# Patient Record
Sex: MALE | Race: WHITE | NOT HISPANIC OR LATINO | Employment: UNEMPLOYED | URBAN - METROPOLITAN AREA
[De-identification: names, ages, dates, MRNs, and addresses within clinical notes are randomized per-mention and may not be internally consistent; named-entity substitution may affect disease eponyms.]

---

## 2020-03-27 ENCOUNTER — TRANSFERRED RECORDS (OUTPATIENT)
Dept: HEALTH INFORMATION MANAGEMENT | Facility: CLINIC | Age: 45
End: 2020-03-27

## 2020-09-28 ENCOUNTER — TRANSFERRED RECORDS (OUTPATIENT)
Dept: HEALTH INFORMATION MANAGEMENT | Facility: CLINIC | Age: 45
End: 2020-09-28

## 2020-11-05 ENCOUNTER — TRANSFERRED RECORDS (OUTPATIENT)
Dept: HEALTH INFORMATION MANAGEMENT | Facility: CLINIC | Age: 45
End: 2020-11-05

## 2021-02-03 ENCOUNTER — TRANSFERRED RECORDS (OUTPATIENT)
Dept: HEALTH INFORMATION MANAGEMENT | Facility: CLINIC | Age: 46
End: 2021-02-03

## 2021-05-24 ENCOUNTER — TRANSFERRED RECORDS (OUTPATIENT)
Dept: HEALTH INFORMATION MANAGEMENT | Facility: CLINIC | Age: 46
End: 2021-05-24

## 2021-10-24 ENCOUNTER — TRANSFERRED RECORDS (OUTPATIENT)
Dept: HEALTH INFORMATION MANAGEMENT | Facility: CLINIC | Age: 46
End: 2021-10-24

## 2021-11-15 ENCOUNTER — TRANSFERRED RECORDS (OUTPATIENT)
Dept: HEALTH INFORMATION MANAGEMENT | Facility: CLINIC | Age: 46
End: 2021-11-15
Payer: MEDICAID

## 2021-11-16 ENCOUNTER — TRANSCRIBE ORDERS (OUTPATIENT)
Dept: OTHER | Age: 46
End: 2021-11-16
Payer: MEDICAID

## 2021-11-16 DIAGNOSIS — I47.19 ATRIAL TACHYCARDIA (H): ICD-10-CM

## 2021-11-16 DIAGNOSIS — I42.0 DILATED CARDIOMYOPATHY (H): ICD-10-CM

## 2021-11-16 DIAGNOSIS — I50.22 CHRONIC SYSTOLIC CONGESTIVE HEART FAILURE (H): Primary | ICD-10-CM

## 2021-11-17 NOTE — TELEPHONE ENCOUNTER
RECORDS RECEIVED FROM:    DATE RECEIVED:    NOTES STATUS DETAILS   OFFICE NOTE from referring provider    Care Everywhere Dr. Luis Eugene 11-15-21   OFFICE NOTE from other cardiologists   and neurologists Care Everywhere Dr. Luis Eugene 11-15-21   DISCHARGE SUMMARY from hospital    N/A    DISCHARGE REPORT from the ER   Care Everywhere 10-24-21   OPERATIVE REPORT    N/A    MEDICATION LIST   N/A    LABS     BMP   Internal 4-16-21   CBC   N/A    CMP   Internal 10-24-21   Lipids   Internal 1-8-21   TSH   Internal 4-16-21   DIAGNOSTIC PROCEDURES     EKG  Strips *important In process 10-24-21 [facility]   Monitor Reports  Strips *important N/A    Cardioversions   N/A    ICD/pacemaker implant   Yes    Tilt table studies   Care Everywhere 5-24-21   IMAGING (DISC & REPORT)      ECHO's   In process 11-15-21 [facility]   Stress Tests   In process 2-3-21 [facility]   Cath   N/A    CT/CTA   N/A    MRI/MRA   N/A      Action 11/17/21 [facility]   Action Taken Requested from [facility]:    Echo    Pacemaker eval    EKG Strip    Stress EKG   11-15-21, 3-28-20    10-26-21    10-24-21, 11-5-20, 9-28-20, 3-27-20    2-3-21     Sent pacemaker eval, stress EKG, and EKG strips to scanning    --11/19/21 MV 12.44pm--  Imaging disk received from [facility] and brought to [facility] for processing.    Images on disk:  Echo Complete 11/15/21  Echo Complete 3/28/20

## 2021-11-21 NOTE — PROGRESS NOTES
"    ELECTROPHYSIOLOGY VIRTUAL CLINIC VISIT  Mr. Vinay Patrick is a 46 year old male who is being evaluated via a billable video visit.      The patient has been notified of following:     \"This video visit will be conducted via a call between you and your physician/provider. We have found that certain health care needs can be provided without the need for an in-person physical exam.  This service lets us provide the care you need with a video conversation.  If a prescription is necessary we can send it directly to your pharmacy.  If lab work is needed we can place an order for that and you can then stop by our lab to have the test done at a later time.  1  If during the course of the call the physician/provider feels a video visit is not appropriate, you will not be charged for this service.\"     Physician/provider has received verbal consent for a Video Visit from the patient? Yes    Assessment/Recommendations   Assessment/Plan:    Mr. 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  He is referred for management of recent VT event with an ICD shock.    The patient has inappropriate sinus tachycardia and is on ivabradine.  He does have also autonomic dysfunction.  Some of the episodes recorded on the device will one-to-one SVT or sinus tachycardia.  Given his history they are very likely to be sinus tachycardia although the tracings are not very clear on the faxed documents.  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  This is his first episode since 3 years ago in 2018 when he had his last ICD shock.  Of note, the patient had stopped his medications around August 2021 and had just started his medications a few weeks before this event.  He has not had any recurrences since this event.      We " discussed with the patient the possibility of VT ablation.  We discussed the procedure in detail with the patient including the risks and success rate.  The risks include vascular bleeding in the groin or around the heart, risk of stroke, and increased risk of bleeding around the heart if we need to do epicardial access for epicardial VT ablation, AV block, heart failure decompensation.  We currently do not advise to pursue a VT ablation at this time given that the recurrence happened very close to him restarting his heart failure medications that were stopped in August 2021.  Furthermore, given the the previous VT was in 2018, it makes it less likely to induce his clinical VT during the procedure.  There is a good chance that he will do well for a longer period of time if he is compliant with his medications.  The other option to consider is antiarrhythmic medications.  However, given his liver cirrhosis and structural heart disease, several of the usually considered antiarrhythmic medications are either absolutely or partially contraindicated.  We could try mexiletine again, with the other options being sotalol or dofetilide.  Mexiletine and dofetilide have limited efficacy for ventricular arrhythmias and sotalol might exacerbate his dizziness or syncope.  Amiodarone would not be the first choice given his age and his liver cirrhosis.  We also considered increasing his carvedilol however he expressed to us that his dizziness got worse with higher doses given his history of autonomic dysfunction.  At this stage, we do not see a very good reason to escalate medical therapy or add antiarrhythmic medications given the infrequency of the events.  However, if the episodes increase in frequency and the VT is monomorphic, we will consider ablation.    I convened with Dr. Christianson who saw him today from heart failure standpoint.  He agrees with the plan.  He is evaluating his heart failure and moderate to severe mitral valve  regurgitation.  He would be aiming to see him in about a month or so.  I want him to see the patient in 3 months virtually or in person depending on the outcome of his discussion with heart failure team.    Maxwell Goldberg MD [facility]  Cardiology - Electrophysiology         History of Present Illness/Subjective    Mr. Vinay Patrick is a 46 year old male who comes in today for EP consultation of VT with ICD therapies.    Mr. 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     He was first diagnosed with NICM in 2016 with echo at that time showing LVEF 20%, severe MR, and grade II diastolic dysfunction. Angiogram at that time showed no obstructive CAD. He was placed on GDMT and LVEF remained reduced. He had ICD implanted in 12/2017. On 11/8/2018 he had VF arrest with ICD shock -200 ms. Mexiletine was started at that time and Coreg was increased. At some point his Mexiletine was stopped. He had further ICD shock on 10/24/21. Device reportedly showed a 1:1 tachycardia at 460 ms prior to going into VT at 240 ms for 18 seconds and device delivered a shock. His device was reprogrammed. His device has also recorded a number of tachycardia episodes that are reported to be 1:1 ST vs SVT -380 ms. He has had a number of syncopal episodes not related to arrhythmias. He had a tilt table test in 5/2021 that showed delayed OH. His bumex was decreased and he was recommended to wear compression stockings. He has had difficulties tolerating neurohormonal agents due to hypotension and syncope related to this. Because of this he stopped taking his medications 8/2021 and was off of them for about 2 months. He was recently resumed on his medications.     He was referred today for VT management. He also has referral and appointment to see Dr. Batista. He  reports feeling well in general.  His heart failure is reportedly reasonably well controlled.  However, he does report that he feels short of breath after walking half a block to a full block and has to stop after a block or a full flight of stairs.  He denies any lower extremity edema, no chest pain on exertion.  He does report that he is not having syncope at this moment but he has episodes of dizziness especially when he stands up.  He does endorse a history of inappropriate sinus tachycardia and his heart rate in general is on the higher side between 101 130 bpm.. His last device interrogation done on 10/24/2021 was reviewed in person and that showed the VT at 240 ms that was treated.  It is monomorphic.  He mentions that his last ICD shock before that was in 2018.  We do not have the tracings for this event.. Last echo from OSH on 11/15/21 showed LVEF 20%, markedly dilated LV, moderate/severe MR, reduced RV function, and mildly dilated RA. Current cardiac medications include: Ivabradine, Entresto, Coreg, Crestor, Bumex, and ASA.     His family history is remarkable for CAD and ischemic cardiomyopathy especially on his father side.    I have reviewed and updated the patient's Past Medical History, Social History, Family History and Medication List.     Cardiographics (Personally Reviewed) :   11/15/21 Echo (OSH Report):   Conclusions     Left Ventricle:   Markedly dilated left ventricle. Markedly reduced left ventricular systolic function. The ejection fraction is visually estimated to   be 20 %.     Left Atrium:   Mildly dilated left atrium.     Aortic Valve:   Trivial aortic regurgitation.     Mitral Valve:   Moderate-to-severe mitral regurgitation.     Right Ventricle:   Reduced right ventricular systolic function.     Right Atrium:   Normal right atrial size by visual assessment.     Tricuspid Valve:   Trivial tricuspid regurgitation.       Comparison Study   Comparison Date: 03/28/2020   Comparison Study:  Transthoracic Echocardiogram   Mitral valve regurgitation is moderate-to-severe, RV function has worsened, LV size (LVEDD) has increased          Physical Examination   There were no vitals taken for this visit.  Wt Readings from Last 3 Encounters:   No data found for Wt     The rest of a comprehensive physical examination is deferred  CONSITUTIONAL: no acute distress  HEENT: no icterus, no redness or discharge, neck supple  CV: no visible edema of visualized extremities. No JVD.   RESPIRATORY: respirations nonlabored, no cough  NEURO: AA&Ox3, speech fluent/appropriate, motor grossly nonfocal  PSYCH: cooperative, affect appropriate           His labs including CBC CMP TSH were done in outside hospital reviewed.  There are no labs hospital system.      Video-Visit Details    Type of service:  Video Visit    Video Visit Duration: 30    Video start 12:30 PM  Video End : 1:00 PM    Originating Location (pt. Location): Home    Distant Location (provider location):  [facility]     Mode of Communication:  Video Conference via [employer]    I have reviewed the note as documented above.  This accurately captures the substance of my virtual visit with the patient. The patient states understanding and is agreeable with the plan.   Maxwell Goldberg MD [facility]  Cardiology - Electrophysiology      Total time spent on patient visit, reviewing notes, imaging, labs, orders, and completing necessary documentation: 60 minutes.

## 2021-11-23 ENCOUNTER — PRE VISIT (OUTPATIENT)
Dept: CARDIOLOGY | Facility: CLINIC | Age: 46
End: 2021-11-23
Payer: MEDICAID

## 2021-11-23 ENCOUNTER — VIRTUAL VISIT (OUTPATIENT)
Dept: CARDIOLOGY | Facility: CLINIC | Age: 46
End: 2021-11-23
Attending: INTERNAL MEDICINE
Payer: MEDICAID

## 2021-11-23 DIAGNOSIS — I50.20 HEART FAILURE WITH REDUCED EJECTION FRACTION, NYHA CLASS III (H): ICD-10-CM

## 2021-11-23 DIAGNOSIS — I42.0 DILATED CARDIOMYOPATHY (H): Primary | ICD-10-CM

## 2021-11-23 DIAGNOSIS — I42.8 NONISCHEMIC CARDIOMYOPATHY (H): ICD-10-CM

## 2021-11-23 DIAGNOSIS — I10 BENIGN ESSENTIAL HYPERTENSION: ICD-10-CM

## 2021-11-23 DIAGNOSIS — I47.29 PAROXYSMAL VENTRICULAR TACHYCARDIA (H): ICD-10-CM

## 2021-11-23 DIAGNOSIS — E78.2 MIXED HYPERLIPIDEMIA: Primary | ICD-10-CM

## 2021-11-23 PROCEDURE — 99204 OFFICE O/P NEW MOD 45 MIN: CPT | Mod: 95 | Performed by: INTERNAL MEDICINE

## 2021-11-23 PROCEDURE — 99205 OFFICE O/P NEW HI 60 MIN: CPT | Mod: 95 | Performed by: INTERNAL MEDICINE

## 2021-11-23 PROCEDURE — G0463 HOSPITAL OUTPT CLINIC VISIT: HCPCS | Mod: PN,RTG | Performed by: INTERNAL MEDICINE

## 2021-11-23 RX ORDER — ROSUVASTATIN CALCIUM 20 MG/1
20 TABLET, COATED ORAL DAILY
COMMUNITY
Start: 2021-10-21

## 2021-11-23 RX ORDER — DAPAGLIFLOZIN 10 MG/1
10 TABLET, FILM COATED ORAL DAILY
COMMUNITY
Start: 2021-11-17 | End: 2022-11-22

## 2021-11-23 RX ORDER — ONDANSETRON 4 MG/1
4 TABLET, FILM COATED ORAL PRN
COMMUNITY
Start: 2021-10-21

## 2021-11-23 RX ORDER — BUMETANIDE 2 MG/1
2 TABLET ORAL DAILY
COMMUNITY
Start: 2021-11-15 | End: 2021-12-15

## 2021-11-23 RX ORDER — CARVEDILOL 12.5 MG/1
1 TABLET ORAL 2 TIMES DAILY
COMMUNITY
Start: 2021-11-15 | End: 2022-03-03

## 2021-11-23 RX ORDER — SPIRONOLACTONE 25 MG/1
12.5 TABLET ORAL DAILY
Qty: 45 TABLET | Refills: 3 | Status: SHIPPED | OUTPATIENT
Start: 2021-11-23

## 2021-11-23 RX ORDER — IVABRADINE 7.5 MG/1
7.5 TABLET, FILM COATED ORAL 2 TIMES DAILY
COMMUNITY
Start: 2021-11-18

## 2021-11-23 RX ORDER — BUSPIRONE HYDROCHLORIDE 10 MG/1
2 TABLET ORAL 2 TIMES DAILY
COMMUNITY
Start: 2021-11-04

## 2021-11-23 RX ORDER — ALBUTEROL SULFATE 90 UG/1
2 AEROSOL, METERED RESPIRATORY (INHALATION) PRN
COMMUNITY
Start: 2021-09-15

## 2021-11-23 RX ORDER — QUETIAPINE FUMARATE 100 MG/1
150 TABLET, FILM COATED ORAL DAILY
COMMUNITY
Start: 2021-11-04

## 2021-11-23 RX ORDER — DIVALPROEX SODIUM 500 MG/1
1000 TABLET, EXTENDED RELEASE ORAL 2 TIMES DAILY
COMMUNITY
Start: 2021-11-04

## 2021-11-23 RX ORDER — QUETIAPINE FUMARATE 25 MG/1
25 TABLET, FILM COATED ORAL 2 TIMES DAILY
COMMUNITY
Start: 2021-11-04

## 2021-11-23 RX ORDER — FENOFIBRATE 160 MG/1
160 TABLET ORAL DAILY
COMMUNITY
Start: 2021-10-21 | End: 2022-10-26

## 2021-11-23 RX ORDER — BACLOFEN 10 MG/1
1 TABLET ORAL 2 TIMES DAILY PRN
COMMUNITY
Start: 2021-10-11

## 2021-11-23 RX ORDER — TRAZODONE HYDROCHLORIDE 50 MG/1
25-50 TABLET, FILM COATED ORAL PRN
COMMUNITY
Start: 2021-11-04

## 2021-11-23 RX ORDER — PAROXETINE 40 MG/1
60 TABLET, FILM COATED ORAL DAILY
COMMUNITY
Start: 2021-11-04

## 2021-11-23 NOTE — LETTER
Date:November 24, 2021      Patient was self referred, no letter generated. Do not send.        [facility] Health Information

## 2021-11-23 NOTE — LETTER
"11/23/2021      RE: Vinay Patrick  [address]  [place] [address]       Dear Colleague,    Thank you for the opportunity to participate in the care of your patient, Vinay Patrick, at the [facility] [place] [facility]. Please see a copy of my visit note below.        ELECTROPHYSIOLOGY VIRTUAL CLINIC VISIT  Mr. Vinay Patrick is a 46 year old male who is being evaluated via a billable video visit.      The patient has been notified of following:     \"This video visit will be conducted via a call between you and your physician/provider. We have found that certain health care needs can be provided without the need for an in-person physical exam.  This service lets us provide the care you need with a video conversation.  If a prescription is necessary we can send it directly to your pharmacy.  If lab work is needed we can place an order for that and you can then stop by our lab to have the test done at a later time.  1  If during the course of the call the physician/provider feels a video visit is not appropriate, you will not be charged for this service.\"     Physician/provider has received verbal consent for a Video Visit from the patient? Yes    Assessment/Recommendations   Assessment/Plan:    Mr. 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  He is referred for management of recent VT event with an ICD shock.    The patient has inappropriate sinus tachycardia and is on ivabradine.  He does have also autonomic dysfunction.  Some of the episodes recorded on the device will one-to-one SVT or sinus tachycardia.  Given his history they are very likely to be sinus tachycardia although the tracings are not very clear on the faxed documents.  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  This is his first episode since 3 years ago in 2018 when he had his last ICD shock.  Of note, the patient had stopped his medications around August 2021 and had just started his medications a few weeks before this event.  He has not had any recurrences since this event.      We discussed with the patient the possibility of VT ablation.  We discussed the procedure in detail with the patient including the risks and success rate.  The risks include vascular bleeding in the groin or around the heart, risk of stroke, and increased risk of bleeding around the heart if we need to do epicardial access for epicardial VT ablation, AV block, heart failure decompensation.  We currently do not advise to pursue a VT ablation at this time given that the recurrence happened very close to him restarting his heart failure medications that were stopped in August 2021.  Furthermore, given the the previous VT was in 2018, it makes it less likely to induce his clinical VT during the procedure.  There is a good chance that he will do well for a longer period of time if he is compliant with his medications.  The other option to consider is antiarrhythmic medications.  However, given his liver cirrhosis and structural heart disease, several of the usually considered antiarrhythmic medications are either absolutely or partially contraindicated.  We could try mexiletine again, with the other options being sotalol or dofetilide.  Mexiletine and dofetilide have limited efficacy for ventricular arrhythmias and sotalol might exacerbate his dizziness or syncope.  Amiodarone would not be the first choice given his age and his liver cirrhosis.  We also considered increasing his carvedilol however he expressed to us that his dizziness got worse with higher doses given his history of autonomic dysfunction.  At this stage, we do not see a very good reason to escalate medical therapy  or add antiarrhythmic medications given the infrequency of the events.  However, if the episodes increase in frequency and the VT is monomorphic, we will consider ablation.    I convened with Dr. Christianson who saw him today from heart failure standpoint.  He agrees with the plan.  He is evaluating his heart failure and moderate to severe mitral valve regurgitation.  He would be aiming to see him in about a month or so.  I want him to see the patient in 3 months virtually or in person depending on the outcome of his discussion with heart failure team.    Maxwell Goldberg MD [facility]  Cardiology - Electrophysiology         History of Present Illness/Subjective    Mr. Vinay Patrick is a 46 year old male who comes in today for EP consultation of VT with ICD therapies.    Mr. 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     He was first diagnosed with NICM in 2016 with echo at that time showing LVEF 20%, severe MR, and grade II diastolic dysfunction. Angiogram at that time showed no obstructive CAD. He was placed on GDMT and LVEF remained reduced. He had ICD implanted in 12/2017. On 11/8/2018 he had VF arrest with ICD shock -200 ms. Mexiletine was started at that time and Coreg was increased. At some point his Mexiletine was stopped. He had further ICD shock on 10/24/21. Device reportedly showed a 1:1 tachycardia at 460 ms prior to going into VT at 240 ms for 18 seconds and device delivered a shock. His device was reprogrammed. His device has also recorded a number of tachycardia episodes that are reported to be 1:1 ST vs SVT -380 ms. He has had a number of syncopal episodes not related to arrhythmias. He had a tilt table test in 5/2021 that showed delayed OH. His bumex was decreased and he was recommended to wear compression stockings. He  has had difficulties tolerating neurohormonal agents due to hypotension and syncope related to this. Because of this he stopped taking his medications 8/2021 and was off of them for about 2 months. He was recently resumed on his medications.     He was referred today for VT management. He also has referral and appointment to see Dr. Batista. He reports feeling well in general.  His heart failure is reportedly reasonably well controlled.  However, he does report that he feels short of breath after walking half a block to a full block and has to stop after a block or a full flight of stairs.  He denies any lower extremity edema, no chest pain on exertion.  He does report that he is not having syncope at this moment but he has episodes of dizziness especially when he stands up.  He does endorse a history of inappropriate sinus tachycardia and his heart rate in general is on the higher side between 101 130 bpm.. His last device interrogation done on 10/24/2021 was reviewed in person and that showed the VT at 240 ms that was treated.  It is monomorphic.  He mentions that his last ICD shock before that was in 2018.  We do not have the tracings for this event.. Last echo from OSH on 11/15/21 showed LVEF 20%, markedly dilated LV, moderate/severe MR, reduced RV function, and mildly dilated RA. Current cardiac medications include: Ivabradine, Entresto, Coreg, Crestor, Bumex, and ASA.     His family history is remarkable for CAD and ischemic cardiomyopathy especially on his father side.    I have reviewed and updated the patient's Past Medical History, Social History, Family History and Medication List.     Cardiographics (Personally Reviewed) :   11/15/21 Echo (OSH Report):   Conclusions     Left Ventricle:   Markedly dilated left ventricle. Markedly reduced left ventricular systolic function. The ejection fraction is visually estimated to   be 20 %.     Left Atrium:   Mildly dilated left atrium.     Aortic Valve:   Trivial  aortic regurgitation.     Mitral Valve:   Moderate-to-severe mitral regurgitation.     Right Ventricle:   Reduced right ventricular systolic function.     Right Atrium:   Normal right atrial size by visual assessment.     Tricuspid Valve:   Trivial tricuspid regurgitation.       Comparison Study   Comparison Date: 03/28/2020   Comparison Study: Transthoracic Echocardiogram   Mitral valve regurgitation is moderate-to-severe, RV function has worsened, LV size (LVEDD) has increased          Physical Examination   There were no vitals taken for this visit.  Wt Readings from Last 3 Encounters:   No data found for Wt     The rest of a comprehensive physical examination is deferred  CONSITUTIONAL: no acute distress  HEENT: no icterus, no redness or discharge, neck supple  CV: no visible edema of visualized extremities. No JVD.   RESPIRATORY: respirations nonlabored, no cough  NEURO: AA&Ox3, speech fluent/appropriate, motor grossly nonfocal  PSYCH: cooperative, affect appropriate           His labs including CBC CMP TSH were done in outside hospital reviewed.  There are no labs hospital system.      Video-Visit Details    Type of service:  Video Visit    Video Visit Duration: 30    Originating Location (pt. Location): Home    Distant Location (provider location):  [facility]     Mode of Communication:  Video Conference via [employer]    I have reviewed the note as documented above.  This accurately captures the substance of my virtual visit with the patient. The patient states understanding and is agreeable with the plan.   Maxwell Goldberg MD [facility]  Cardiology - Electrophysiology      Total time spent on patient visit, reviewing notes, imaging, labs, orders, and completing necessary documentation: 60 minutes.                    Vinay is a 46 year old who is being evaluated via a billable video visit.      How would you like to obtain your AVS? Mail a copy  If the video visit is dropped, the  invitation should be resent by: Text to cell phone: [phone]  Will anyone else be joining your video visit? No              Please do not hesitate to contact me if you have any questions/concerns.     Sincerely,     Maxwell Goldberg MD

## 2021-11-23 NOTE — LETTER
11/23/2021      RE: Vinay Patrick  [address]  [facility] [address]       Dear Colleague,    Thank you for the opportunity to participate in the care of your patient, Vinay Patrick, at the [facility] [place] at [facility]. Please see a copy of my visit note below.    November 23, 2021     Mr. Patrick is a 46 year old male with PMHx of systolic heart failure, HFrEF with an EF:15-20%, NICM, ICD for primary prevention, Vtach who presents today for new patient visit.    Patient has a history of NICM. Was first diagnosed on 12/14/2016 with an EF:20%. Patient underwent coronary angiogram that revealed no coronary artery disease. Repeat echo revealed an EF:30%, and as a result patient had an ICD placed for primary prevention on 12/13/2017. Patient had a VF arrest on 11/8/2018 that necessitated an appropriate shock from ICD at the time. Patient was admitted and discharged.  Patient had several ICD interrogations which revealed runs of NSVT and episode of SVT. Patient has a known history of medication noncompliance and continues to smoke 1/2 PPD. Due to patient's persistent LVEF of 20%, he was referred to advanced heart failure clinic for further work up.    During today's visit, patient endorses NYHA class III symptoms. He walks 0.5blocks with worsening SOB. Denies PND, orthopnea, lightheadedness and ddizziness.  He did endorse an episode of device shock on 10/2021. Stated that he was cooking a meal when he developed lightheadedness and a device shock. No LOC.     SOCIAL HISTORY:  Social History     Socioeconomic History     Marital status:      Spouse name: Not on file     Number of children: Not on file     Years of education: Not on file     Highest education level: Not on file   Occupational History     Not on file   Tobacco Use     Smoking status: Not on file     Smokeless tobacco: Not on file   Substance and Sexual Activity      Alcohol use: Not on file     Drug use: Not on file     Sexual activity: Not on file   Other Topics Concern     Not on file   Social History Narrative     Not on file     Social Determinants of Health     Financial Resource Strain: Not on file   Food Insecurity: Not on file   Transportation Needs: Not on file   Physical Activity: Not on file   Stress: Not on file   Social Connections: Not on file   Intimate Partner Violence: Not on file   Housing Stability: Not on file     CURRENT MEDICATIONS:    sacubitril-valsartan (ENTRESTO) 24-26 MG tablet Take 0.5 tablets by mouth 2 times a day     carVEDilol (COREG) 12.5 mg tablet Take 1 tablet (12.5 mg) by mouth 2 times a day with meals     bumetanide (BUMEX) 2 mg tablet Take 1 tablet (2 mg) by mouth 1 time per day     ivabradine (CORLANOR) 7.5 MG tablet Take 1 tablet (7.5 mg) by mouth 2 times a day     dapagliflozin (FARXIGA) 10 mg tablet Take 1 tablet (10 mg) by mouth 1 time a day in the morning     busPIRone (BUSPAR) 10 mg tablet Take 2 tablets (20 mg) by mouth 2 times a day     QUEtiapine (SEROQUEL) 100 mg tablet Take 1.5 tablets (150 mg) by mouth every night at bedtime     QUEtiapine (SEROQUEL) 25 mg tablet Take 1 tablet (25 mg) by mouth 2 times a day     PARoxetine (PAXIL) 40 mg tablet Take 1.5 tablets (60 mg) by mouth 1 time a day in the morning     divalproex (DEPAKOTE ER) 500 mg extended release tablet (24 hr) Take 2 tablets (1,000 mg) by mouth 2 times a day     traZODone (DESYREL) 50 mg tablet Take 0.5-1 tablets (25-50 mg) by mouth at bedtime as needed for insomnia     insulin aspart (NOVOLOG) subcutaneous injection (pen) 10 units before each meal. Max daily dose 40 units.     rosuvastatin (CRESTOR) 20 mg tablet Take 1 tablet (20 mg) by mouth 1 time per day     fenofibrate (LOFIBRA) 160 mg tablet Take 1 tablet (160 mg) by mouth 1 time per day     insulin glargine (LANTUS SOLOSTAR) subcutaneous injection solution (pen) Inject 40 Units subcutaneously 2 times a day      ondansetron (ZOFRAN) 4 mg tablet Take 1 tablet (4 mg) by mouth every 6 hours as needed for nausea     baclofen (LIORESAL) 10 mg tablet TAKE ONE TABLET BY MOUTH TWICE A DAY AS NEEDED FOR MUSCLE SPASMS     albuterol HFA (PROAIR HFA) 108 (90 Base) MCG/ACT inhaler Inhale 2 puffs orally Every 4 hours as needed for shortness of breath, wheezing or cough     glucagon, rDNA, (GLUCAGEN) 1 mg injection kit Inject 1 mg intramuscularly 1 time when needed for other (Specify) (low glucose and unable to take glucose by mouth) for up to 1 dose     Contour NEXT test strip TEST FOUR TIMES DAILY FOR TYPE 2 DIABETES ON INSULIN     fluticasone-salmeterol (ADVAIR) 250-50 mcg/dose discus Inhale 1 puff orally 2 times a day Rinse mouth after use.     tiotropium (SPIRIVA) 18 MCG aer cap Using the handihaler device,inhale contents of 1 capsule(18 mcg) using 2 inhalations per capsule/day     Lancets (MICROLET) MISC TEST FOUR TIMES DAILY FOR TYPE 2 DIABETES ON INSULIN     insulin needle (BD PEN NEEDLE GRACE U/F) 32G X 4 mm Use as directed for insulin injections 4x/day     aspirin 81 mg chewable tablet Take 1 tablet (81 mg total) by mouth 1 time per day     Oxygen therapy 1-3 L as needed (when at home and SOB)         ROS:   Constitutional: No fever, chills, or sweats.No weight  ENT: No visual disturbance, ear ache, epistaxis, sore throat.   Allergies/Immunologic: Negative.   Respiratory: No cough, hemoptysis.   Cardiovascular: As per HPI.   GI: No nausea, vomiting, hematemesis, melena, or hematochezia.   : No urinary frequency, dysuria, or hematuria.   Integument: Negative.   Psychiatric: Pleasant, no major depression noted  Neuro: No focal neurological deficits noted  Endocrinology: Negative.   Musculoskeletal: As per HPI.      EXAM:  Deferred due to video visit      Labs:  No labs today     TTE 11/10/2021  Contrast,Definity   Conclusions   Left Ventricle:   Markedly dilated left ventricle. Markedly reduced left ventricular systolic function.  The ejection fraction is visually estimated to be 20 %.     Left Atrium: Mildly dilated left atrium.     Aortic Valve: Trivial aortic regurgitation.     Mitral Valve:  Moderate-to-severe mitral regurgitation.     Right Ventricle: Reduced right ventricular systolic function.     Right Atrium: Normal right atrial size by visual assessment.     Tricuspid Valve: Trivial tricuspid regurgitation.        ASSESSMENT AND PLAN:     Mr. Patrick is a 46 year old male with PMHx of systolic heart failure, HFrEF with an EF:15-20%, NICM, ICD for primary prevention, Vtach who presents today for new patient visit.    #NICM  #HFrEF with an EF: 15-20%  #S/p ICD  Patient with a history of NICM and an EF:20%. Endorsing NYHA Class 3 symptoms with no significant improvement in his symptoms despite being on OGDT. His symptoms are likely multifactorial from his heart failure, vtach and his moderate-severe MR. Out initial recommendation is to work on his OGDT. We will increase his entresto to 24/26mg BID and start aldactone 25mg qday. He may require a RHC at a later date to assess his hemodynamics.   - Coreg 12.5mg BID  -Entresto 12/14mg BID-->24/26mg BID   - ASA 81mg  - Farxiga 10mg qday   - Crestor 20mg qday, consider increasing in the future given severely elevated Triglycerides  - Bumex 2mg qday, continue  - Start Aldactone 12.5mgqday.   - Ivabradine 7.5mg qday  - Obtain RHC potentially after these changes in a month. Will discuss prior    #Moderate to severe MR  EF:20%, LVESD is 6.5cm and severe MR with an ERO:0.4. His BP at the time of the echo was 120/90 with a MAP of 90. With an elevated MAP, we are concerned that his MR could be artifically higher.  We will increase entresto, improve his afterload and recheck a TTE to assess his MR once his MAP has improved  - Afterload reduction as above and  Repeat TTE afterwards    Oliver  Has a history of ventricular tachycardia with recent device shock on 10/2021  Referred to EP    Staffed with  Dr. Lester Lyon MD  Cardiology Fellow  PGY6    I have seen and evaluated the patient and agree with the assessment and plan as above.  Santiago Batista MD    Vinay is a 46 year old who is being evaluated via a billable video visit.      How would you like to obtain your AVS? Mail  If the video visit is dropped, the invitation should be resent by: Text to cell phone: [phone]  Will anyone else be joining your video visit? No      Video Start Time: 11:50  Video-Visit Details    Type of service:  Video Visit    Video End Time:12:30p    Originating Location (pt. Location): Home    Distant Location (provider location):  [facility]     Platform used for Video Visit: [employer]        Please do not hesitate to contact me if you have any questions/concerns.     Sincerely,     Santiago Batista MD

## 2021-11-23 NOTE — PROGRESS NOTES
Vinay is a 46 year old who is being evaluated via a billable video visit.      How would you like to obtain your AVS? Mail a copy  If the video visit is dropped, the invitation should be resent by: Text to cell phone: [phone]  Will anyone else be joining your video visit? No

## 2021-11-23 NOTE — PATIENT INSTRUCTIONS
Cardiology Provider you saw in clinic today: Dr. Goldberg, he would like to see you back in 3 months.    Please contact us via [employer] for questions or concerns.     Rosa Pope RN   Electrophysiology Nurse Coordinator.  [phone]     If your question concerns the schedule/appointment times, contact:  [facility]   [phone]     Device Clinic (Pacemakers, ICD, Loop Recorders)   [phone]

## 2021-11-23 NOTE — PATIENT INSTRUCTIONS
Dr. Batista recommends:    Increase Entresto to 24-26 MG twice daily. (prescription sent to pharmacy)    Start taking Spironolactone 12.5 MG once daily. (prescription sent to pharmacy)    Echocardiogram in 3 weeks. (order e-mailed to patient)    Follow up virtual visit with Dr. Batista on 1/11/21 at noon. (per Dr. Batista)    Thank you for your visit today.  Please call me with any questions or concerns.   Grayson Carr RN  Cardiology Care Coordinator  [phone], press option 1 then option 4

## 2021-11-23 NOTE — PROGRESS NOTES
Vinay is a 46 year old who is being evaluated via a billable video visit.      How would you like to obtain your AVS? Mail  If the video visit is dropped, the invitation should be resent by: Text to cell phone: [phone]  Will anyone else be joining your video visit? No      Video Start Time: 11:50  Video-Visit Details    Type of service:  Video Visit    Video End Time:12:30p    Originating Location (pt. Location): Home    Distant Location (provider location):  [facility]     Platform used for Video Visit: [facility]

## 2021-11-23 NOTE — PROGRESS NOTES
November 23, 2021     Mr. Patrick is a 46 year old male with PMHx of systolic heart failure, HFrEF with an EF:15-20%, NICM, ICD for primary prevention, Vtach who presents today for new patient visit.    Patient has a history of NICM. Was first diagnosed on 12/14/2016 with an EF:20%. Patient underwent coronary angiogram that revealed no coronary artery disease. Repeat echo revealed an EF:30%, and as a result patient had an ICD placed for primary prevention on 12/13/2017. Patient had a VF arrest on 11/8/2018 that necessitated an appropriate shock from ICD at the time. Patient was admitted and discharged.  Patient had several ICD interrogations which revealed runs of NSVT and episode of SVT. Patient has a known history of medication noncompliance and continues to smoke 1/2 PPD. Due to patient's persistent LVEF of 20%, he was referred to advanced heart failure clinic for further work up.    During today's visit, patient endorses NYHA class III symptoms. He walks 0.5blocks with worsening SOB. Denies PND, orthopnea, lightheadedness and ddizziness.  He did endorse an episode of device shock on 10/2021. Stated that he was cooking a meal when he developed lightheadedness and a device shock. No LOC.     SOCIAL HISTORY:  Social History     Socioeconomic History     Marital status:      Spouse name: Not on file     Number of children: Not on file     Years of education: Not on file     Highest education level: Not on file   Occupational History     Not on file   Tobacco Use     Smoking status: Not on file     Smokeless tobacco: Not on file   Substance and Sexual Activity     Alcohol use: Not on file     Drug use: Not on file     Sexual activity: Not on file   Other Topics Concern     Not on file   Social History Narrative     Not on file     Social Determinants of Health     Financial Resource Strain: Not on file   Food Insecurity: Not on file   Transportation Needs: Not on file   Physical Activity: Not on file   Stress:  Not on file   Social Connections: Not on file   Intimate Partner Violence: Not on file   Housing Stability: Not on file     CURRENT MEDICATIONS:    sacubitril-valsartan (ENTRESTO) 24-26 MG tablet Take 0.5 tablets by mouth 2 times a day     carVEDilol (COREG) 12.5 mg tablet Take 1 tablet (12.5 mg) by mouth 2 times a day with meals     bumetanide (BUMEX) 2 mg tablet Take 1 tablet (2 mg) by mouth 1 time per day     ivabradine (CORLANOR) 7.5 MG tablet Take 1 tablet (7.5 mg) by mouth 2 times a day     dapagliflozin (FARXIGA) 10 mg tablet Take 1 tablet (10 mg) by mouth 1 time a day in the morning     busPIRone (BUSPAR) 10 mg tablet Take 2 tablets (20 mg) by mouth 2 times a day     QUEtiapine (SEROQUEL) 100 mg tablet Take 1.5 tablets (150 mg) by mouth every night at bedtime     QUEtiapine (SEROQUEL) 25 mg tablet Take 1 tablet (25 mg) by mouth 2 times a day     PARoxetine (PAXIL) 40 mg tablet Take 1.5 tablets (60 mg) by mouth 1 time a day in the morning     divalproex (DEPAKOTE ER) 500 mg extended release tablet (24 hr) Take 2 tablets (1,000 mg) by mouth 2 times a day     traZODone (DESYREL) 50 mg tablet Take 0.5-1 tablets (25-50 mg) by mouth at bedtime as needed for insomnia     insulin aspart (NOVOLOG) subcutaneous injection (pen) 10 units before each meal. Max daily dose 40 units.     rosuvastatin (CRESTOR) 20 mg tablet Take 1 tablet (20 mg) by mouth 1 time per day     fenofibrate (LOFIBRA) 160 mg tablet Take 1 tablet (160 mg) by mouth 1 time per day     insulin glargine (LANTUS SOLOSTAR) subcutaneous injection solution (pen) Inject 40 Units subcutaneously 2 times a day     ondansetron (ZOFRAN) 4 mg tablet Take 1 tablet (4 mg) by mouth every 6 hours as needed for nausea     baclofen (LIORESAL) 10 mg tablet TAKE ONE TABLET BY MOUTH TWICE A DAY AS NEEDED FOR MUSCLE SPASMS     albuterol HFA (PROAIR HFA) 108 (90 Base) MCG/ACT inhaler Inhale 2 puffs orally Every 4 hours as needed for shortness of breath, wheezing or cough      glucagon, rDNA, (GLUCAGEN) 1 mg injection kit Inject 1 mg intramuscularly 1 time when needed for other (Specify) (low glucose and unable to take glucose by mouth) for up to 1 dose     Contour NEXT test strip TEST FOUR TIMES DAILY FOR TYPE 2 DIABETES ON INSULIN     fluticasone-salmeterol (ADVAIR) 250-50 mcg/dose discus Inhale 1 puff orally 2 times a day Rinse mouth after use.     tiotropium (SPIRIVA) 18 MCG aer cap Using the handihaler device,inhale contents of 1 capsule(18 mcg) using 2 inhalations per capsule/day     Lancets (MICROLET) MISC TEST FOUR TIMES DAILY FOR TYPE 2 DIABETES ON INSULIN     insulin needle (BD PEN NEEDLE GRACE U/F) 32G X 4 mm Use as directed for insulin injections 4x/day     aspirin 81 mg chewable tablet Take 1 tablet (81 mg total) by mouth 1 time per day     Oxygen therapy 1-3 L as needed (when at home and SOB)         ROS:   Constitutional: No fever, chills, or sweats.No weight  ENT: No visual disturbance, ear ache, epistaxis, sore throat.   Allergies/Immunologic: Negative.   Respiratory: No cough, hemoptysis.   Cardiovascular: As per HPI.   GI: No nausea, vomiting, hematemesis, melena, or hematochezia.   : No urinary frequency, dysuria, or hematuria.   Integument: Negative.   Psychiatric: Pleasant, no major depression noted  Neuro: No focal neurological deficits noted  Endocrinology: Negative.   Musculoskeletal: As per HPI.      EXAM:  Deferred due to video visit      Labs:  No labs today     TTE 11/10/2021  Contrast,Definity   Conclusions   Left Ventricle:   Markedly dilated left ventricle. Markedly reduced left ventricular systolic function. The ejection fraction is visually estimated to be 20 %.     Left Atrium: Mildly dilated left atrium.     Aortic Valve: Trivial aortic regurgitation.     Mitral Valve:  Moderate-to-severe mitral regurgitation.     Right Ventricle: Reduced right ventricular systolic function.     Right Atrium: Normal right atrial size by visual assessment.     Tricuspid  Valve: Trivial tricuspid regurgitation.        ASSESSMENT AND PLAN:     Mr. Patrick is a 46 year old male with PMHx of systolic heart failure, HFrEF with an EF:15-20%, NICM, ICD for primary prevention, Vtach who presents today for new patient visit.    #NICM  #HFrEF with an EF: 15-20%  #S/p ICD  Patient with a history of NICM and an EF:20%. Endorsing NYHA Class 3 symptoms with no significant improvement in his symptoms despite being on OGDT. His symptoms are likely multifactorial from his heart failure, vtach and his moderate-severe MR. Out initial recommendation is to work on his OGDT. We will increase his entresto to 24/26mg BID and start aldactone 25mg qday. He may require a RHC at a later date to assess his hemodynamics.   - Coreg 12.5mg BID  -Entresto 12/14mg BID-->24/26mg BID   - ASA 81mg  - Farxiga 10mg qday   - Crestor 20mg qday, consider increasing in the future given severely elevated Triglycerides  - Bumex 2mg qday, continue  - Start Aldactone 12.5mgqday.   - Ivabradine 7.5mg qday  - Obtain RHC potentially after these changes in a month. Will discuss prior    #Moderate to severe MR  EF:20%, LVESD is 6.5cm and severe MR with an ERO:0.4. His BP at the time of the echo was 120/90 with a MAP of 90. With an elevated MAP, we are concerned that his MR could be artifically higher.  We will increase entresto, improve his afterload and recheck a TTE to assess his MR once his MAP has improved  - Afterload reduction as above and  Repeat TTE afterwards    Oliver  Has a history of ventricular tachycardia with recent device shock on 10/2021  Referred to EP    Staffed with Dr. Lester Lyon MD  Cardiology Fellow  PGY6    I have seen and evaluated the patient and agree with the assessment and plan as above.  Santiago Batista MD

## 2022-01-10 NOTE — PROGRESS NOTES
This was a video visit due to the COVID-19 pandemic  Video platform was Outbox  video start time 12:01 PM  Video end time 12:22 PM  [place].  [place] [facility] [place]  Mr. Patrick is a 46 year old male with PMHx of systolic heart failure, HFrEF with an EF:15-20%, NICM, ICD for primary prevention, Vtach who presents today for follow-up visit after recent virtual appt to establish care.   Patient has a history of NICM. Was first diagnosed on 12/14/2016 with an EF:20%. Patient underwent coronary angiogram that revealed no coronary artery disease. Repeat echo revealed an EF:30%, and as a result patient had an ICD placed for primary prevention on 12/13/2017. Patient had a VF arrest on 11/8/2018 that necessitated an appropriate shock from ICD at the time. Patient was admitted and discharged.  Patient had several ICD interrogations which revealed runs of NSVT and episode of SVT. Patient has a known history of medication noncompliance and continues to smoke 1/2 PPD. Due to patient's persistent LVEF of 20%, he was referred to advanced heart failure clinic for further work up.  The most recent visit we decided to try to optimize his guideline directed medical therapy given that he was on minimal medications at the time.  We did make some adjustments including afterload reduction however he does not that he does not feel significantly better.  In fact he feels tired and fatigue and does not notice any difference in how he feels.  He continues to have significant shortness of breath.  He also does have episodes of palpitations.  No device shock or no syncope.  No other complaints.    SOCIAL HISTORY:  Social History     Socioeconomic History     Marital status:      Spouse name: Not on file     Number of children: Not on file     Years of education: Not on file     Highest education level: Not on file   Occupational History     Not on file   Tobacco Use     Smoking status: Not on file      Smokeless tobacco: Not on file   Substance and Sexual Activity     Alcohol use: Not on file     Drug use: Not on file     Sexual activity: Not on file   Other Topics Concern     Not on file   Social History Narrative     Not on file     Social Determinants of Health     Financial Resource Strain: Not on file   Food Insecurity: Not on file   Transportation Needs: Not on file   Physical Activity: Not on file   Stress: Not on file   Social Connections: Not on file   Intimate Partner Violence: Not on file   Housing Stability: Not on file     CURRENT MEDICATIONS:  Current Outpatient Medications   Medication     albuterol (PROAIR HFA/PROVENTIL HFA/VENTOLIN HFA) 108 (90 Base) MCG/ACT inhaler     baclofen (LIORESAL) 10 MG tablet     bumetanide (BUMEX) 2 MG tablet     busPIRone (BUSPAR) 10 MG tablet     carvedilol (COREG) 12.5 MG tablet     dapagliflozin (FARXIGA) 10 MG TABS tablet     divalproex sodium extended-release (DEPAKOTE ER) 500 MG 24 hr tablet     fenofibrate (TRIGLIDE/LOFIBRA) 160 MG tablet     glucagon 1 MG kit     insulin glargine (LANTUS PEN) 100 UNIT/ML pen     ivabradine (CORLANOR) 7.5 MG tablet     ondansetron (ZOFRAN) 4 MG tablet     PARoxetine (PAXIL) 40 MG tablet     QUEtiapine (SEROQUEL) 100 MG tablet     QUEtiapine (SEROQUEL) 25 MG tablet     rosuvastatin (CRESTOR) 20 MG tablet     sacubitril-valsartan (ENTRESTO) 24-26 MG per tablet     spironolactone (ALDACTONE) 25 MG tablet     traZODone (DESYREL) 50 MG tablet     No current facility-administered medications for this visit.     ROS:   Constitutional: No fever, chills, or sweats. Weight is 0 lbs 0 oz  ENT: No visual disturbance, ear ache, epistaxis, sore throat.   Allergies/Immunologic: Negative.   Respiratory: No cough, hemoptysis.   Cardiovascular: As per HPI.   GI: No nausea, vomiting, hematemesis, melena, or hematochezia.   : No urinary frequency, dysuria, or hematuria.   Integument: Negative.   Psychiatric: Pleasant, no major depression  noted  Neuro: No focal neurological deficits noted  Endocrinology: Negative.   Musculoskeletal: As per HPI.      EXAM:  No vitals for this encounter given video encounter.  General: appears comfortable, alert and oriented  Head: normocephalic, atraumatic  Eyes: anicteric sclera, EOMI , PERRL  Neck: no adenopathy  Orophyarynx: moist mucosa, no lesions noted  Heart: regular, S1/S2, no murmurs, rubs or gallop. Estimated JVP at 5 cmH2O  Lungs: CTAB, No wheezing.   Abdomen: soft, non-tender, bowel sounds present, no hepatosplenomegaly  Extremities: No LE edema today  Skin: no open lesions noted  Neuro: grossly non-focal     Labs:  Available reviewed    TTE 11/10/2021  Contrast,Definity Left Ventricle:   Markedly dilated left ventricle. Markedly reduced left ventricular systolic function. The ejection fraction is visually estimated to be 20 %.     Left Atrium: Mildly dilated left atrium.     Aortic Valve: Trivial aortic regurgitation.     Mitral Valve:  Moderate-to-severe mitral regurgitation.     Right Ventricle: Reduced right ventricular systolic function.     Right Atrium: Normal right atrial size by visual assessment.     Tricuspid Valve: Trivial tricuspid regurgitation.        ASSESSMENT AND PLAN:     Mr. Patrick is a 46 year old male with PMHx of systolic heart failure, HFrEF with an EF:15-20%, NICM, ICD for primary prevention, Vtach who presents today for follow-up with a virtual encounter given patient's location.     #NICM  #HFrEF with an EF: 15-20%  #S/p ICD  Patient with a history of NICM and an EF:20%. Endorsing NYHA Class 3 symptoms with no significant improvement in his symptoms despite being on OGDT. His symptoms are likely multifactorial from his heart failure, vtach and his moderate-severe MR. Our initial recommendation was to work on his OGDT. We increased his entresto to 24/26mg BID and started aldactone 12.5mg qday.  However only minimal changes.  Noted symptomatically.  As such we decided to complete  further evaluation and we did set him up for CPX which has to be a bike CPX given that he has significant back pain and unable to walk on a treadmill.  In addition we will proceed with a LORENE to further define the mitral regurgitation as well as a right heart catheterization to define his hemodynamics.  Based on results we will decide what the next best steps such as [id number] repair or surgical interventions.  No medication changes today we will reevaluate after the tests are done and second part of January.  - Coreg 12.5mg BID  -Entresto continue 24 mg twice daily  - ASA 81mg  - Farxiga 10mg qday   - Crestor 20mg qday, consider increasing in the future given severely elevated Triglycerides  - Bumex 2mg qday, continue  - Start Aldactone as above  - Ivabradine 7.5mg qday    #Moderate to severe MR  EF:20%, LVESD is 6.5cm and severe MR with an ERO:0.4. His BP at the time of the echo was 120/90 with a MAP of 90. With an elevated MAP, we are concerned that his MR could be artifically higher.  We will increase entresto, improve his afterload and recheck a TTE to assess his MR once his MAP has improved  - Afterload reduction as above   -Set him up for LORENE as above as well as right heart cath     #Vtach  Has a history of ventricular tachycardia with recent device shock on 10/2021  Referred to EP    I appreciate the opportunity to participate in the care of Vinay Patrick . Please do not hesitate to contact me with any further questions.    Sincerely,   Santiago Batista MD  [facility] Division of Cardiology

## 2022-01-11 ENCOUNTER — VIRTUAL VISIT (OUTPATIENT)
Dept: CARDIOLOGY | Facility: CLINIC | Age: 47
End: 2022-01-11
Attending: INTERNAL MEDICINE
Payer: MEDICAID

## 2022-01-11 DIAGNOSIS — E78.2 MIXED HYPERLIPIDEMIA: ICD-10-CM

## 2022-01-11 DIAGNOSIS — I10 BENIGN ESSENTIAL HYPERTENSION: ICD-10-CM

## 2022-01-11 DIAGNOSIS — Z11.59 ENCOUNTER FOR SCREENING FOR OTHER VIRAL DISEASES: Primary | ICD-10-CM

## 2022-01-11 DIAGNOSIS — I50.20 HEART FAILURE WITH REDUCED EJECTION FRACTION, NYHA CLASS III (H): ICD-10-CM

## 2022-01-11 DIAGNOSIS — I42.8 NONISCHEMIC CARDIOMYOPATHY (H): ICD-10-CM

## 2022-01-11 DIAGNOSIS — I51.89 OTHER ILL-DEFINED HEART DISEASES: Primary | ICD-10-CM

## 2022-01-11 PROCEDURE — 99215 OFFICE O/P EST HI 40 MIN: CPT | Mod: 95 | Performed by: INTERNAL MEDICINE

## 2022-01-11 RX ORDER — LIDOCAINE 40 MG/G
CREAM TOPICAL
Status: CANCELLED | OUTPATIENT
Start: 2022-01-11

## 2022-01-11 ASSESSMENT — PATIENT HEALTH QUESTIONNAIRE - PHQ9: SUM OF ALL RESPONSES TO PHQ QUESTIONS 1-9: 24

## 2022-01-11 NOTE — LETTER
Date:January 12, 2022      Patient was self referred, no letter generated. Do not send.        [facility] Health Information

## 2022-01-11 NOTE — PATIENT INSTRUCTIONS
Dr. Batista recommends:    Cardiopulmonary bike stress test, transesophageal echocardiogram, and right heart cath for the near future.  Schedule for 1/26/22 arriving at 8:00 AM to the [facility] Waiting room located on the main floor of the [facility] [place]. The hospital is located at [address] [place] [address], [facility].    Please don't eat or drink for 6 hours prior to arrival. Please have someone available to drive you home after the procedures.    Here is the schedule:  1/26/22:  8:00 Check in to [facility] waiting room on the main floor of [facility] [place]. You will have labs drawn at this time.  8:30  After the labs you will have a bike cardio pulmonary stress test.  10:30 Then a transesophageal echocardiogram  1:00  Followed by a right heart cath    Follow up virtual visit with Dr. Batista in early March. Please call [phone] to schedule this visit.    Thank you for your visit today.  Please call me with any questions or concerns.   Grayson Carr RN  Cardiology Care Coordinator  [phone], press option 1 then option 4

## 2022-01-11 NOTE — LETTER
1/11/2022      RE: Vinay Patrick  [address]  [place] [address]       Dear Colleague,    Thank you for the opportunity to participate in the care of your patient, Vinay Patrick, at the [facility] [place] [facility]. Please see a copy of my visit note below.    This was a video visit due to the COVID-19 pandemic  Video platform was [employer]venu  video start time 12:01 PM  Video end time 12:22 PM  Patient location [place].  [place] [facility] [place]    January 11, 2022  Mr. Patrick is a 46 year old male with PMHx of systolic heart failure, HFrEF with an EF:15-20%, NICM, ICD for primary prevention, Vtach who presents today for follow-up visit after recent virtual appt to establish care.   Patient has a history of NICM. Was first diagnosed on 12/14/2016 with an EF:20%. Patient underwent coronary angiogram that revealed no coronary artery disease. Repeat echo revealed an EF:30%, and as a result patient had an ICD placed for primary prevention on 12/13/2017. Patient had a VF arrest on 11/8/2018 that necessitated an appropriate shock from ICD at the time. Patient was admitted and discharged.  Patient had several ICD interrogations which revealed runs of NSVT and episode of SVT. Patient has a known history of medication noncompliance and continues to smoke 1/2 PPD. Due to patient's persistent LVEF of 20%, he was referred to advanced heart failure clinic for further work up.  The most recent visit we decided to try to optimize his guideline directed medical therapy given that he was on minimal medications at the time.  We did make some adjustments including afterload reduction however he does not that he does not feel significantly better.  In fact he feels tired and fatigue and does not notice any difference in how he feels.  He continues to have significant shortness of breath.  He also does have episodes of palpitations.  No device shock or no  syncope.  No other complaints.    SOCIAL HISTORY:  Social History     Socioeconomic History     Marital status:      Spouse name: Not on file     Number of children: Not on file     Years of education: Not on file     Highest education level: Not on file   Occupational History     Not on file   Tobacco Use     Smoking status: Not on file     Smokeless tobacco: Not on file   Substance and Sexual Activity     Alcohol use: Not on file     Drug use: Not on file     Sexual activity: Not on file   Other Topics Concern     Not on file   Social History Narrative     Not on file     Social Determinants of Health     Financial Resource Strain: Not on file   Food Insecurity: Not on file   Transportation Needs: Not on file   Physical Activity: Not on file   Stress: Not on file   Social Connections: Not on file   Intimate Partner Violence: Not on file   Housing Stability: Not on file     CURRENT MEDICATIONS:  Current Outpatient Medications   Medication     albuterol (PROAIR HFA/PROVENTIL HFA/VENTOLIN HFA) 108 (90 Base) MCG/ACT inhaler     baclofen (LIORESAL) 10 MG tablet     bumetanide (BUMEX) 2 MG tablet     busPIRone (BUSPAR) 10 MG tablet     carvedilol (COREG) 12.5 MG tablet     dapagliflozin (FARXIGA) 10 MG TABS tablet     divalproex sodium extended-release (DEPAKOTE ER) 500 MG 24 hr tablet     fenofibrate (TRIGLIDE/LOFIBRA) 160 MG tablet     glucagon 1 MG kit     insulin glargine (LANTUS PEN) 100 UNIT/ML pen     ivabradine (CORLANOR) 7.5 MG tablet     ondansetron (ZOFRAN) 4 MG tablet     PARoxetine (PAXIL) 40 MG tablet     QUEtiapine (SEROQUEL) 100 MG tablet     QUEtiapine (SEROQUEL) 25 MG tablet     rosuvastatin (CRESTOR) 20 MG tablet     sacubitril-valsartan (ENTRESTO) 24-26 MG per tablet     spironolactone (ALDACTONE) 25 MG tablet     traZODone (DESYREL) 50 MG tablet     No current facility-administered medications for this visit.     ROS:   Constitutional: No fever, chills, or sweats. Weight is 0 lbs 0 oz  ENT:  No visual disturbance, ear ache, epistaxis, sore throat.   Allergies/Immunologic: Negative.   Respiratory: No cough, hemoptysis.   Cardiovascular: As per HPI.   GI: No nausea, vomiting, hematemesis, melena, or hematochezia.   : No urinary frequency, dysuria, or hematuria.   Integument: Negative.   Psychiatric: Pleasant, no major depression noted  Neuro: No focal neurological deficits noted  Endocrinology: Negative.   Musculoskeletal: As per HPI.      EXAM:  No vitals for this encounter given video encounter.  General: appears comfortable, alert and oriented  Head: normocephalic, atraumatic  Eyes: anicteric sclera, EOMI , PERRL  Neck: no adenopathy  Orophyarynx: moist mucosa, no lesions noted  Heart: regular, S1/S2, no murmurs, rubs or gallop. Estimated JVP at 5 cmH2O  Lungs: CTAB, No wheezing.   Abdomen: soft, non-tender, bowel sounds present, no hepatosplenomegaly  Extremities: No LE edema today  Skin: no open lesions noted  Neuro: grossly non-focal     Labs:  Available reviewed    TTE 11/10/2021  Contrast,Definity Left Ventricle:   Markedly dilated left ventricle. Markedly reduced left ventricular systolic function. The ejection fraction is visually estimated to be 20 %.     Left Atrium: Mildly dilated left atrium.     Aortic Valve: Trivial aortic regurgitation.     Mitral Valve:  Moderate-to-severe mitral regurgitation.     Right Ventricle: Reduced right ventricular systolic function.     Right Atrium: Normal right atrial size by visual assessment.     Tricuspid Valve: Trivial tricuspid regurgitation.        ASSESSMENT AND PLAN:     Mr. Patrick is a 46 year old male with PMHx of systolic heart failure, HFrEF with an EF:15-20%, NICM, ICD for primary prevention, Vtach who presents today for follow-up with a virtual encounter given patient's location.     #NICM  #HFrEF with an EF: 15-20%  #S/p ICD  Patient with a history of NICM and an EF:20%. Endorsing NYHA Class 3 symptoms with no significant improvement in his  symptoms despite being on OGDT. His symptoms are likely multifactorial from his heart failure, vtach and his moderate-severe MR. Our initial recommendation was to work on his OGDT. We increased his entresto to 24/26mg BID and started aldactone 12.5mg qday.  However only minimal changes.  Noted symptomatically.  As such we decided to complete further evaluation and we did set him up for CPX which has to be a bike CPX given that he has significant back pain and unable to walk on a treadmill.  In addition we will proceed with a LORENE to further define the mitral regurgitation as well as a right heart catheterization to define his hemodynamics.  Based on results we will decide what the next best steps such as [id number] repair or surgical interventions.  No medication changes today we will reevaluate after the tests are done and second part of January.  - Coreg 12.5mg BID  -Entresto continue 24 mg twice daily  - ASA 81mg  - Farxiga 10mg qday   - Crestor 20mg qday, consider increasing in the future given severely elevated Triglycerides  - Bumex 2mg qday, continue  - Start Aldactone as above  - Ivabradine 7.5mg qday    #Moderate to severe MR  EF:20%, LVESD is 6.5cm and severe MR with an ERO:0.4. His BP at the time of the echo was 120/90 with a MAP of 90. With an elevated MAP, we are concerned that his MR could be artifically higher.  We will increase entresto, improve his afterload and recheck a TTE to assess his MR once his MAP has improved  - Afterload reduction as above   -Set him up for LORENE as above as well as right heart cath     #Vtach  Has a history of ventricular tachycardia with recent device shock on 10/2021  Referred to EP    I appreciate the opportunity to participate in the care of Vinay Patrick . Please do not hesitate to contact me with any further questions.    Sincerely,   Santiago Batista MD  [facility] Division of Cardiology             Please do not hesitate to contact me if you have any  questions/concerns.     Sincerely,     Santiago Batista MD

## 2022-01-11 NOTE — PROGRESS NOTES
"Vinay is a 46 year old who is being evaluated via a billable video visit.      How would you like to obtain your AVS? MyChart  If the video visit is dropped, the invitation should be resent by: Send to e-mail at: [email or web address]  Will anyone else be joining your video visit? No  {If patient encounters technical issues they should call [phone] :[id number]}     Yasmin Wheat Visit Facilitator/MA.      Video Start Time: {video visit start/end time for provider to select:448056}  Video-Visit Details    Type of service:  Video Visit    Video End Time:{video visit start/end time for provider to select:152948}    Originating Location (pt. Location): {video visit patient location:470324::\"Home\"}    Distant Location (provider location):  [facility]     Platform used for Video Visit: {Virtual Visit Platforms:582618::\"Well\"}  "

## 2022-01-11 NOTE — NURSING NOTE
Chief Complaint   Patient presents with     Follow Up     here today to f/u on ECHO results, may want to do more testing. Reviewed medications with pt. Pt scored 24 on PHQ-9, please review.     Yasmin Wheat, Visit Facilitator/MA.

## 2022-01-25 ENCOUNTER — TELEPHONE (OUTPATIENT)
Dept: CARDIOLOGY | Facility: CLINIC | Age: 47
End: 2022-01-25
Payer: MEDICAID

## 2022-01-25 ENCOUNTER — HOSPITAL ENCOUNTER (OUTPATIENT)
Facility: CLINIC | Age: 47
Discharge: HOME | End: 2022-01-25
Admitting: INTERNAL MEDICINE
Payer: MEDICAID

## 2022-01-25 ENCOUNTER — LAB (OUTPATIENT)
Dept: LAB | Facility: CLINIC | Age: 47
End: 2022-01-25
Attending: INTERNAL MEDICINE
Payer: MEDICAID

## 2022-01-25 DIAGNOSIS — Z11.59 ENCOUNTER FOR SCREENING FOR OTHER VIRAL DISEASES: ICD-10-CM

## 2022-01-25 LAB — SARS-COV-2 RNA RESP QL NAA+PROBE: NEGATIVE

## 2022-01-25 PROCEDURE — U0003 INFECTIOUS AGENT DETECTION BY NUCLEIC ACID (DNA OR RNA); SEVERE ACUTE RESPIRATORY SYNDROME CORONAVIRUS 2 (SARS-COV-2) (CORONAVIRUS DISEASE [COVID-19]), AMPLIFIED PROBE TECHNIQUE, MAKING USE OF HIGH THROUGHPUT TECHNOLOGIES AS DESCRIBED BY CMS-2020-01-R: HCPCS

## 2022-01-25 NOTE — TELEPHONE ENCOUNTER
Call complete for pre procedure reminder  and updated visitor policy.  Pt states he did not get a covid test. Will have pt get in today.

## 2022-01-26 ENCOUNTER — ANCILLARY PROCEDURE (OUTPATIENT)
Dept: CARDIOLOGY | Facility: CLINIC | Age: 47
End: 2022-01-26
Attending: INTERNAL MEDICINE
Payer: MEDICAID

## 2022-01-26 ENCOUNTER — APPOINTMENT (OUTPATIENT)
Dept: MEDSURG UNIT | Facility: CLINIC | Age: 47
End: 2022-01-26
Attending: INTERNAL MEDICINE
Payer: MEDICAID

## 2022-01-26 ENCOUNTER — HOSPITAL ENCOUNTER (OUTPATIENT)
Dept: CARDIOLOGY | Facility: CLINIC | Age: 47
End: 2022-01-26
Attending: INTERNAL MEDICINE | Admitting: INTERNAL MEDICINE
Payer: MEDICAID

## 2022-01-26 ENCOUNTER — APPOINTMENT (OUTPATIENT)
Dept: LAB | Facility: CLINIC | Age: 47
End: 2022-01-26
Attending: INTERNAL MEDICINE
Payer: MEDICAID

## 2022-01-26 ENCOUNTER — HOSPITAL ENCOUNTER (OUTPATIENT)
Facility: CLINIC | Age: 47
Discharge: HOME | End: 2022-01-26
Attending: INTERNAL MEDICINE | Admitting: INTERNAL MEDICINE
Payer: MEDICAID

## 2022-01-26 VITALS
OXYGEN SATURATION: 97 % | SYSTOLIC BLOOD PRESSURE: 90 MMHG | HEART RATE: 69 BPM | RESPIRATION RATE: 16 BRPM | DIASTOLIC BLOOD PRESSURE: 59 MMHG

## 2022-01-26 VITALS — BODY MASS INDEX: 26.51 KG/M2 | HEIGHT: 71 IN | WEIGHT: 189.38 LBS

## 2022-01-26 VITALS
RESPIRATION RATE: 16 BRPM | DIASTOLIC BLOOD PRESSURE: 75 MMHG | SYSTOLIC BLOOD PRESSURE: 110 MMHG | OXYGEN SATURATION: 99 % | HEART RATE: 76 BPM

## 2022-01-26 DIAGNOSIS — I50.20 HEART FAILURE WITH REDUCED EJECTION FRACTION, NYHA CLASS III (H): ICD-10-CM

## 2022-01-26 DIAGNOSIS — I51.89 OTHER ILL-DEFINED HEART DISEASES: ICD-10-CM

## 2022-01-26 LAB
ALBUMIN SERPL-MCNC: 3.5 G/DL (ref 3.4–5)
ALP SERPL-CCNC: 71 U/L (ref 40–150)
ALT SERPL W P-5'-P-CCNC: 46 U/L (ref 0–70)
ANION GAP SERPL CALCULATED.3IONS-SCNC: 9 MMOL/L (ref 3–14)
AST SERPL W P-5'-P-CCNC: 21 U/L (ref 0–45)
BASOPHILS # BLD AUTO: 0.1 10E3/UL (ref 0–0.2)
BASOPHILS NFR BLD AUTO: 1 %
BILIRUB SERPL-MCNC: 0.8 MG/DL (ref 0.2–1.3)
BUN SERPL-MCNC: 20 MG/DL (ref 7–30)
CALCIUM SERPL-MCNC: 9.1 MG/DL (ref 8.5–10.1)
CHLORIDE BLD-SCNC: 108 MMOL/L (ref 94–109)
CO2 SERPL-SCNC: 22 MMOL/L (ref 20–32)
CREAT SERPL-MCNC: 0.84 MG/DL (ref 0.66–1.25)
EOSINOPHIL # BLD AUTO: 0.3 10E3/UL (ref 0–0.7)
EOSINOPHIL NFR BLD AUTO: 3 %
ERYTHROCYTE [DISTWIDTH] IN BLOOD BY AUTOMATED COUNT: 13.3 % (ref 10–15)
GFR SERPL CREATININE-BSD FRML MDRD: >90 ML/MIN/1.73M2
GLUCOSE BLD-MCNC: 203 MG/DL (ref 70–99)
HCT VFR BLD AUTO: 43.7 % (ref 40–53)
HGB BLD-MCNC: 14.4 G/DL (ref 13.3–17.7)
HGB BLD-MCNC: 14.5 G/DL (ref 13.3–17.7)
HGB BLD-MCNC: 14.9 G/DL (ref 13.3–17.7)
IMM GRANULOCYTES # BLD: 0.1 10E3/UL
IMM GRANULOCYTES NFR BLD: 1 %
INR PPP: 1 (ref 0.85–1.15)
LVEF ECHO: NORMAL
LYMPHOCYTES # BLD AUTO: 3.4 10E3/UL (ref 0.8–5.3)
LYMPHOCYTES NFR BLD AUTO: 38 %
MCH RBC QN AUTO: 32.1 PG (ref 26.5–33)
MCHC RBC AUTO-ENTMCNC: 34.1 G/DL (ref 31.5–36.5)
MCV RBC AUTO: 94 FL (ref 78–100)
MONOCYTES # BLD AUTO: 0.6 10E3/UL (ref 0–1.3)
MONOCYTES NFR BLD AUTO: 7 %
NEUTROPHILS # BLD AUTO: 4.6 10E3/UL (ref 1.6–8.3)
NEUTROPHILS NFR BLD AUTO: 50 %
NRBC # BLD AUTO: 0 10E3/UL
NRBC BLD AUTO-RTO: 0 /100
OXYHGB MFR BLDV: 65 % (ref 92–100)
OXYHGB MFR BLDV: 93 % (ref 92–100)
PLATELET # BLD AUTO: 151 10E3/UL (ref 150–450)
POTASSIUM BLD-SCNC: 3.9 MMOL/L (ref 3.4–5.3)
PROT SERPL-MCNC: 7.4 G/DL (ref 6.8–8.8)
RBC # BLD AUTO: 4.64 10E6/UL (ref 4.4–5.9)
SODIUM SERPL-SCNC: 139 MMOL/L (ref 133–144)
WBC # BLD AUTO: 9 10E3/UL (ref 4–11)

## 2022-01-26 PROCEDURE — 93451 RIGHT HEART CATH: CPT | Performed by: INTERNAL MEDICINE

## 2022-01-26 PROCEDURE — 85025 COMPLETE CBC W/AUTO DIFF WBC: CPT | Performed by: INTERNAL MEDICINE

## 2022-01-26 PROCEDURE — 93325 DOPPLER ECHO COLOR FLOW MAPG: CPT

## 2022-01-26 PROCEDURE — 93312 ECHO TRANSESOPHAGEAL: CPT | Mod: 26 | Performed by: INTERNAL MEDICINE

## 2022-01-26 PROCEDURE — 85610 PROTHROMBIN TIME: CPT | Performed by: INTERNAL MEDICINE

## 2022-01-26 PROCEDURE — 80053 COMPREHEN METABOLIC PANEL: CPT | Performed by: INTERNAL MEDICINE

## 2022-01-26 PROCEDURE — 76376 3D RENDER W/INTRP POSTPROCES: CPT | Mod: 26 | Performed by: INTERNAL MEDICINE

## 2022-01-26 PROCEDURE — 99152 MOD SED SAME PHYS/QHP 5/>YRS: CPT | Performed by: INTERNAL MEDICINE

## 2022-01-26 PROCEDURE — 272N000001 HC OR GENERAL SUPPLY STERILE: Performed by: INTERNAL MEDICINE

## 2022-01-26 PROCEDURE — 94621 CARDIOPULM EXERCISE TESTING: CPT | Mod: 26 | Performed by: INTERNAL MEDICINE

## 2022-01-26 PROCEDURE — 93282 PRGRMG EVAL IMPLANTABLE DFB: CPT

## 2022-01-26 PROCEDURE — 93451 RIGHT HEART CATH: CPT | Mod: 26 | Performed by: INTERNAL MEDICINE

## 2022-01-26 PROCEDURE — 250N000011 HC RX IP 250 OP 636: Performed by: INTERNAL MEDICINE

## 2022-01-26 PROCEDURE — 82810 BLOOD GASES O2 SAT ONLY: CPT

## 2022-01-26 PROCEDURE — 93282 PRGRMG EVAL IMPLANTABLE DFB: CPT | Performed by: INTERNAL MEDICINE

## 2022-01-26 PROCEDURE — 999N000142 HC STATISTIC PROCEDURE PREP ONLY

## 2022-01-26 PROCEDURE — 93320 DOPPLER ECHO COMPLETE: CPT | Mod: 26 | Performed by: INTERNAL MEDICINE

## 2022-01-26 PROCEDURE — 94621 CARDIOPULM EXERCISE TESTING: CPT

## 2022-01-26 PROCEDURE — 999N000132 HC STATISTIC PP CARE STAGE 1

## 2022-01-26 PROCEDURE — 93325 DOPPLER ECHO COLOR FLOW MAPG: CPT | Mod: 26 | Performed by: INTERNAL MEDICINE

## 2022-01-26 PROCEDURE — 85018 HEMOGLOBIN: CPT

## 2022-01-26 PROCEDURE — 250N000009 HC RX 250: Performed by: INTERNAL MEDICINE

## 2022-01-26 PROCEDURE — 36415 COLL VENOUS BLD VENIPUNCTURE: CPT | Performed by: INTERNAL MEDICINE

## 2022-01-26 RX ORDER — LIDOCAINE HYDROCHLORIDE 20 MG/ML
15 SOLUTION OROPHARYNGEAL ONCE
Status: COMPLETED | OUTPATIENT
Start: 2022-01-26 | End: 2022-01-26

## 2022-01-26 RX ORDER — NALOXONE HYDROCHLORIDE 0.4 MG/ML
0.4 INJECTION, SOLUTION INTRAMUSCULAR; INTRAVENOUS; SUBCUTANEOUS
Status: DISCONTINUED | OUTPATIENT
Start: 2022-01-26 | End: 2022-01-27 | Stop reason: HOSPADM

## 2022-01-26 RX ORDER — FENTANYL CITRATE 50 UG/ML
50 INJECTION, SOLUTION INTRAMUSCULAR; INTRAVENOUS ONCE
Status: DISCONTINUED | OUTPATIENT
Start: 2022-01-26 | End: 2022-01-27 | Stop reason: HOSPADM

## 2022-01-26 RX ORDER — NALOXONE HYDROCHLORIDE 0.4 MG/ML
0.2 INJECTION, SOLUTION INTRAMUSCULAR; INTRAVENOUS; SUBCUTANEOUS
Status: DISCONTINUED | OUTPATIENT
Start: 2022-01-26 | End: 2022-01-27 | Stop reason: HOSPADM

## 2022-01-26 RX ORDER — FLUMAZENIL 0.1 MG/ML
0.2 INJECTION, SOLUTION INTRAVENOUS
Status: DISCONTINUED | OUTPATIENT
Start: 2022-01-26 | End: 2022-01-27 | Stop reason: HOSPADM

## 2022-01-26 RX ORDER — LIDOCAINE 40 MG/G
CREAM TOPICAL
Status: DISCONTINUED | OUTPATIENT
Start: 2022-01-26 | End: 2022-01-27 | Stop reason: HOSPADM

## 2022-01-26 RX ORDER — FENTANYL CITRATE 50 UG/ML
25 INJECTION, SOLUTION INTRAMUSCULAR; INTRAVENOUS
Status: DISCONTINUED | OUTPATIENT
Start: 2022-01-26 | End: 2022-01-27 | Stop reason: HOSPADM

## 2022-01-26 RX ORDER — ACYCLOVIR 200 MG/1
9.5 CAPSULE ORAL
Status: DISCONTINUED | OUTPATIENT
Start: 2022-01-26 | End: 2022-01-27 | Stop reason: HOSPADM

## 2022-01-26 RX ORDER — SODIUM CHLORIDE 9 MG/ML
INJECTION, SOLUTION INTRAVENOUS CONTINUOUS PRN
Status: DISCONTINUED | OUTPATIENT
Start: 2022-01-26 | End: 2022-01-27 | Stop reason: HOSPADM

## 2022-01-26 RX ADMIN — FENTANYL CITRATE 50 MCG: 50 INJECTION, SOLUTION INTRAMUSCULAR; INTRAVENOUS at 11:08

## 2022-01-26 RX ADMIN — MIDAZOLAM 1 MG: 1 INJECTION INTRAMUSCULAR; INTRAVENOUS at 11:08

## 2022-01-26 RX ADMIN — MIDAZOLAM 0.5 MG: 1 INJECTION INTRAMUSCULAR; INTRAVENOUS at 11:27

## 2022-01-26 RX ADMIN — MIDAZOLAM 1 MG: 1 INJECTION INTRAMUSCULAR; INTRAVENOUS at 11:06

## 2022-01-26 RX ADMIN — TOPICAL ANESTHETIC 0.5 ML: 200 SPRAY DENTAL; PERIODONTAL at 10:49

## 2022-01-26 RX ADMIN — MIDAZOLAM 0.5 MG: 1 INJECTION INTRAMUSCULAR; INTRAVENOUS at 11:35

## 2022-01-26 RX ADMIN — FENTANYL CITRATE 50 MCG: 50 INJECTION, SOLUTION INTRAMUSCULAR; INTRAVENOUS at 11:06

## 2022-01-26 RX ADMIN — LIDOCAINE HYDROCHLORIDE 30 ML: 20 SOLUTION ORAL; TOPICAL at 10:49

## 2022-01-26 RX ADMIN — MIDAZOLAM 1 MG: 1 INJECTION INTRAMUSCULAR; INTRAVENOUS at 11:12

## 2022-01-26 ASSESSMENT — MIFFLIN-ST. JEOR: SCORE: 1750.87

## 2022-01-26 NOTE — Clinical Note
3min run of VT undetected by ICD. VSS throughout run. MD aware. EP consulted. Will reprogram device on 2A.

## 2022-01-26 NOTE — PRE-PROCEDURE
GENERAL PRE-PROCEDURE:   Procedure:  Right heart catheterization  Date/Time:  1/26/2022 12:52 PM    Verbal consent obtained?: Yes    Risks and benefits: Risks, benefits and alternatives were discussed    Consent given by:  Patient  Patient states understanding of procedure being performed: Yes    Patient's understanding of procedure matches consent: Yes    Procedure consent matches procedure scheduled: Yes    Appropriately NPO:  Yes  Mallampati  :  Grade 1- soft palate, uvula, tonsillar pillars, and posterior pharyngeal wall visible  Lungs:  Lungs clear with good breath sounds bilaterally  Heart:  Normal heart sounds and rate  History & Physical reviewed:  History and physical reviewed and no updates needed  Statement of review:  I have reviewed the lab findings, diagnostic data, medications, and the plan for sedation

## 2022-01-26 NOTE — DISCHARGE INSTRUCTIONS
[facility]                        Interventional Cardiology  Discharge Instructions   Post Right Heart Cath      AFTER YOU GO HOME:    DO drink plenty of fluids    DO resume your regular diet and medications unless otherwise instructed by your Primary Physician    Do Not scrub the procedure site vigorously    No lotion or powder to the puncture site for 3 days    CALL YOUR PRIMARY PHYSICIAN IF: You may resume all normal activity.  Monitor neck site for bleeding, swelling, or voice changes. If you notice bleeding or swelling immediately apply pressure to the site and call number below to speak with Cardiology Fellow.  If you experience any changes in your breathing you should call your doctor immediately or come to the closest Emergency Department.  Do not drive yourself.    ADDITIONAL INSTRUCTIONS: Medications: You are to resume all home medications including anticoagulation therapy unless otherwise advised by your primary cardiologist or nurse coordinator.    Follow Up: Per your primary cardiology team    If you have any questions or concerns regarding your procedure site please call [phone] at anytime and ask for Cardiology Fellow on call.  They are available 24 hours a day.  You may also contact the Cardiology Clinic after hours number at [phone].                                                       Telephone Numbers [phone] Monday-Friday 8:00 am to 4:30 pm    [phone] After 4:30 pm Monday-Friday, Weekends & Holidays  Ask for Interventional Cardiologist on call. Someone is on call 24 hours/day   [facility] toll free number [phone] Monday-Friday 8:00 am to 4:30 pm   [facility] Emergency Dept [phone]

## 2022-01-26 NOTE — PRE-PROCEDURE
GENERAL PRE-PROCEDURE:   Procedure:  Transesophageal echocardigram  Date/Time:  1/26/2022 10:15 AM    Written consent obtained?: Yes    Risks and benefits: Risks, benefits and alternatives were discussed    DC Plan: Appropriate discharge home plan in place for patients who are going home after procedure   Consent given by:  Patient  Patient states understanding of procedure being performed: Yes    Patient's understanding of procedure matches consent: Yes    Procedure consent matches procedure scheduled: Yes    Expected level of sedation:  Moderate  Appropriately NPO:  Yes  ASA Class:  2  Mallampati  :  Grade 2- soft palate, base of uvula, tonsillar pillars, and portion of posterior pharyngeal wall visible  Lungs:  Lungs clear with good breath sounds bilaterally  Heart:  Normal heart sounds and rate and systolic murmur  History & Physical reviewed:  History and physical reviewed and no updates needed  Statement of review:  I have reviewed the lab findings, diagnostic data, medications, and the plan for sedation    Rosy Sam MD  Cardiology Fellow PGY4  P: [phone]

## 2022-01-26 NOTE — PROGRESS NOTES
Patient arrived to room via litter with Cath lab RN s/p IVÁNC . VSS. Denies. Pt alert and oriented x4. Right internal jugular  site CDI, no bleeding or hematoma

## 2022-01-26 NOTE — PROGRESS NOTES
Pt arrived in ECHO department for scheduled LORENE.   Procedure explained, questions answered and consent signed. Discharge instructions discussed with patient and given written copy.  Pt's throat sprayed at 10:50 therefore pt will not be able to eat or drink until 2 hours after at 12:50. Informed pt of this time and encouraged to start with warm fluids and soft foods.    Pt tolerated procedure well, and was given a total of 100 mcg IV fentanyl and 4mg IV versed for conscious sedation.  Pt denied throat or chest pain after LORENE complete.   LORENE probe 64 used for procedure and inserted without difficulty.  Pt denied chest or throat pain after procedure and was monitored until  awake and VSS. Escorted back to 2A for further recovery and prep for next procedure.   Report given to TINO Barrett.

## 2022-01-27 LAB
CARDIOPULMONARY ANAEROBIC THRESHOLD VO2: 0 ML/KG/MIN
CARDIOPULMONARY BREATHING RESERVE REST: 88.8
CARDIOPULMONARY BREATHING RESERVE V02MAX: 72
CARDIOPULMONARY CO2 OUTPUT REST: 287 ML/MIN
CARDIOPULMONARY CO2 OUTPUT VO2MAX: 658 ML/MIN
CARDIOPULMONARY FEV 1.0 (L) ACTUAL: 2.69
CARDIOPULMONARY FEV 1.0 (L) PRECENT: 65 %
CARDIOPULMONARY FEV 1.0 (L) PREDICTED: 4.11
CARDIOPULMONARY FEV 1.0 FVC (%) ACTUAL: 69
CARDIOPULMONARY FEV 1.0 FVC (%) PERCENT: 88 %
CARDIOPULMONARY FEV 1.0 FVC (%) PREDICTED: 78
CARDIOPULMONARY FUNCTIONAL CAPACITY MAX ML/KG/MIN: 6.3 ML/KG/MIN
CARDIOPULMONARY FUNCTIONAL CAPACITY PERCENT: 19 %
CARDIOPULMONARY FUNCTIONAL CAPACITY PREDICTED: 32.9 ML/KG/MIN
CARDIOPULMONARY FVC (L) ACTUAL: 3.91
CARDIOPULMONARY FVC (L) PERCENT: 74 %
CARDIOPULMONARY FVC (L) PREDICTED: 5.28
CARDIOPULMONARY MET'S REST: 1.1
CARDIOPULMONARY MINUTE VENTILATION REST: 10.5 L/MIN
CARDIOPULMONARY MINUTE VENTILATION VO2MAX: 26.8 L/MIN
CARDIOPULMONARY MYOCARDIAC O2 DEMAND MAX: 9408
CARDIOPULMONARY OXYGEN CONSUMPTION REST: 3.9 ML/KG/MIN
CARDIOPULMONARY OXYGEN CONSUMPTION VO2MAX: 6.3 ML/KG/MIN
CARDIOPULMONARY OXYGEN PULSE REST: 4.7 ML/BEAT
CARDIOPULMONARY OXYGEN PULSE VO2MAX: 3.15 ML/BEAT
CARDIOPULMONARY OXYGEN SATURATION- OXIMETRY REST: 100 %
CARDIOPULMONARY OXYGEN SATURATION- OXIMETRY VO2MAX: 98 %
CARDIOPULMONARY PET C02 REST: 29
CARDIOPULMONARY PET C02 VO2MAX: 27
CARDIOPULMONARY PET02 REST: 115
CARDIOPULMONARY PET02 V02 MAX: 112
CARDIOPULMONARY RER: 0.88
CARDIOPULMONARY RESPIRALORY EXCHANGE RATIO VO2MAX: 0.88
CARDIOPULMONARY RESPIRALORY EXCHANGE RATIO: 0.86
CARDIOPULMONARY RESPIRATORY RATE REST: 8 BR/MIN
CARDIOPULMONARY RESPIRATORY RATE VO2MAX: 21 BR/MIN
CARDIOPULMONARY STRESS BASE 1 BP MMHG: NORMAL MMHG
CARDIOPULMONARY STRESS BASE 1 BPA: 80 BPM
CARDIOPULMONARY STRESS BASE 1 SPO2: 98 % SPO2
CARDIOPULMONARY STRESS BASE 1 TIME SEC: 0 SEC
CARDIOPULMONARY STRESS BASE 1 TIME: 1 MINS
CARDIOPULMONARY STRESS BASE 2 BP MMHG: NORMAL MMHG
CARDIOPULMONARY STRESS BASE 2 BPA: 76 BPM
CARDIOPULMONARY STRESS BASE 2 SPO2: 98 % SPO2
CARDIOPULMONARY STRESS BASE 2 TIME SEC: 0 SEC
CARDIOPULMONARY STRESS BASE 2 TIME: 3 MINS
CARDIOPULMONARY STRESS BASE 3 BP MMHG: NORMAL MMHG
CARDIOPULMONARY STRESS BASE 3 BPA: 74 BPM
CARDIOPULMONARY STRESS BASE 3 SPO2: 98 % SPO2
CARDIOPULMONARY STRESS BASE 3 TIME SEC: 0 SEC
CARDIOPULMONARY STRESS BASE 3 TIME: 5 MINS
CARDIOPULMONARY STRESS PHASE 1 BP MMHG: NORMAL MMHG
CARDIOPULMONARY STRESS PHASE 1 BPM: 84 BPM
CARDIOPULMONARY STRESS PHASE 1 SPO2: 99 % SPO2
CARDIOPULMONARY STRESS PHASE 1 TIME SEC: 0 SEC
CARDIOPULMONARY STRESS PHASE 1 TIME: 2 MINS
CARDIOPULMONARY STRESS PHASE 2 BPM: 84 BPM
CARDIOPULMONARY STRESS PHASE 2 SPO2: 99 % SPO2
CARDIOPULMONARY STRESS PHASE 2 TIME SEC: 16 SEC
CARDIOPULMONARY STRESS PHASE 2 TIME: 2 MINS
CARDIOPULMONARY SVC (L) ACTUAL: 3.52
CARDIOPULMONARY SVC (L) PERCENT: 67 %
CARDIOPULMONARY SVC (L) PREDICTED: 5.28
CARDIOPULMONARY TIDAL VOLUME REST: 1246 ML
CARDIOPULMONARY TIDAL VOLUME VO2MAX: 1272 ML
CARDIOPULMONARY VE/VCO2 SLOPE: 40.07
CARDIOPULMONARY VENTILATORY EQUIVALENT 02 REST: 32
CARDIOPULMONARY VENTILATORY EQUIVALENT 02 V02: 40
CARDIOPULMONARY VENTILATORY EQUIVALENT C02 REST: 37
CARDIOPULMONARY VENTILATORY EQUIVALENT C02 SLOPE VO2MAX: 40.07
CARDIOPULMONARY VENTILATORY EQUIVALENT C02 VO2MAX: 41
CV STRESS MAX HR HE: 84
MDC_IDC_LEAD_IMPLANT_DT: NORMAL
MDC_IDC_LEAD_LOCATION: NORMAL
MDC_IDC_LEAD_LOCATION_DETAIL_1: NORMAL
MDC_IDC_LEAD_MFG: NORMAL
MDC_IDC_LEAD_MODEL: NORMAL
MDC_IDC_LEAD_SERIAL: NORMAL
MDC_IDC_MSMT_BATTERY_STATUS: NORMAL
MDC_IDC_MSMT_BATTERY_VOLTAGE: 3.11 V
MDC_IDC_MSMT_CAP_CHARGE_ENERGY: 40 J
MDC_IDC_MSMT_CAP_CHARGE_TIME: 10 S
MDC_IDC_MSMT_CAP_CHARGE_TYPE: NORMAL
MDC_IDC_MSMT_LEADCHNL_RA_SENSING_INTR_AMPL: 2.6 MV
MDC_IDC_MSMT_LEADCHNL_RV_IMPEDANCE_VALUE: 621 OHM
MDC_IDC_MSMT_LEADCHNL_RV_PACING_THRESHOLD_AMPLITUDE: 0.6 V
MDC_IDC_MSMT_LEADCHNL_RV_PACING_THRESHOLD_PULSEWIDTH: 0.4 MS
MDC_IDC_MSMT_LEADCHNL_RV_SENSING_INTR_AMPL: 23.7 MV
MDC_IDC_PG_IMPLANT_DTM: NORMAL
MDC_IDC_PG_MFG: NORMAL
MDC_IDC_PG_MODEL: NORMAL
MDC_IDC_PG_SERIAL: NORMAL
MDC_IDC_PG_TYPE: NORMAL
MDC_IDC_SESS_CLINIC_NAME: NORMAL
MDC_IDC_SESS_DTM: NORMAL
MDC_IDC_SESS_REPROGRAMMED: NORMAL
MDC_IDC_SESS_TYPE: NORMAL
MDC_IDC_SET_BRADY_AT_MODE_SWITCH_MODE: NORMAL
MDC_IDC_SET_BRADY_AT_MODE_SWITCH_RATE: 160 {BEATS}/MIN
MDC_IDC_SET_BRADY_HYSTRATE: 40 {BEATS}/MIN
MDC_IDC_SET_BRADY_LOWRATE: 40 {BEATS}/MIN
MDC_IDC_SET_BRADY_MAX_SENSOR_RATE: 100 {BEATS}/MIN
MDC_IDC_SET_BRADY_MAX_TRACKING_RATE: 110 {BEATS}/MIN
MDC_IDC_SET_BRADY_MODE: NORMAL
MDC_IDC_SET_BRADY_NIGHT_RATE: 40 {BEATS}/MIN
MDC_IDC_SET_CRT_PACED_CHAMBERS: NORMAL
MDC_IDC_SET_LEADCHNL_LV_PACING_CATHODE_ELECTRODE_1: NORMAL
MDC_IDC_SET_LEADCHNL_LV_PACING_CATHODE_LOCATION_1: NORMAL
MDC_IDC_SET_LEADCHNL_LV_PACING_POLARITY: NORMAL
MDC_IDC_SET_LEADCHNL_LV_SENSING_CATHODE_ELECTRODE_1: NORMAL
MDC_IDC_SET_LEADCHNL_LV_SENSING_CATHODE_LOCATION_1: NORMAL
MDC_IDC_SET_LEADCHNL_LV_SENSING_POLARITY: NORMAL
MDC_IDC_SET_LEADCHNL_RA_PACING_CATHODE_ELECTRODE_1: NORMAL
MDC_IDC_SET_LEADCHNL_RA_PACING_CATHODE_LOCATION_1: NORMAL
MDC_IDC_SET_LEADCHNL_RA_PACING_POLARITY: NORMAL
MDC_IDC_SET_LEADCHNL_RA_SENSING_POLARITY: NORMAL
MDC_IDC_SET_LEADCHNL_RA_SENSING_SENSITIVITY: 1 MV
MDC_IDC_SET_LEADCHNL_RV_PACING_AMPLITUDE: 2 V
MDC_IDC_SET_LEADCHNL_RV_PACING_POLARITY: NORMAL
MDC_IDC_SET_LEADCHNL_RV_PACING_PULSEWIDTH: 0.4 MS
MDC_IDC_SET_LEADCHNL_RV_SENSING_POLARITY: NORMAL
MDC_IDC_SET_LEADCHNL_RV_SENSING_SENSITIVITY: 0.8 MV
MDC_IDC_SET_ZONE_DETECTION_INTERVAL: 270 MS
MDC_IDC_SET_ZONE_DETECTION_INTERVAL: 330 MS
MDC_IDC_SET_ZONE_DETECTION_INTERVAL: 480 MS
MDC_IDC_SET_ZONE_TYPE: NORMAL
MDC_IDC_STAT_AT_MODE_SW_COUNT: 10
MDC_IDC_STAT_BRADY_RV_PERCENT_PACED: 3 %
MDC_IDC_STAT_EPISODE_RECENT_COUNT: 10
MDC_IDC_STAT_EPISODE_TYPE: NORMAL
MDC_IDC_STAT_TACHYTHERAPY_SHOCKS_ABORTED_TOTAL: 1
MDC_IDC_STAT_TACHYTHERAPY_SHOCKS_DELIVERED_RECENT: 0
MDC_IDC_STAT_TACHYTHERAPY_SHOCKS_DELIVERED_TOTAL: 2
PREDICTED VO2MAX: 6.3
STRESS ANGINA INDEX: 0
STRESS ECHO BASELINE BP: NORMAL MMHG
STRESS ECHO BASELINE HR: 72 BPM
STRESS ECHO CALCULATED PERCENT HR: 49 %
STRESS ECHO LAST STRESS BP: NORMAL MMHG
STRESS ECHO POST ESTIMATED WORKLOAD: 1.8 METS
STRESS ECHO POST EXERCISE DUR MIN: 2 MIN
STRESS ECHO POST EXERCISE DUR SEC: 16 SEC
STRESS ECHO TARGET HR: 172

## 2022-02-06 ENCOUNTER — HEALTH MAINTENANCE LETTER (OUTPATIENT)
Age: 47
End: 2022-02-06

## 2022-03-03 ENCOUNTER — ANCILLARY PROCEDURE (OUTPATIENT)
Dept: CARDIOLOGY | Facility: CLINIC | Age: 47
End: 2022-03-03
Attending: INTERNAL MEDICINE
Payer: MEDICAID

## 2022-03-03 VITALS
SYSTOLIC BLOOD PRESSURE: 146 MMHG | HEART RATE: 109 BPM | OXYGEN SATURATION: 98 % | DIASTOLIC BLOOD PRESSURE: 101 MMHG | BODY MASS INDEX: 25.72 KG/M2 | WEIGHT: 184.3 LBS

## 2022-03-03 DIAGNOSIS — R00.2 PALPITATIONS: ICD-10-CM

## 2022-03-03 DIAGNOSIS — E78.2 MIXED HYPERLIPIDEMIA: ICD-10-CM

## 2022-03-03 DIAGNOSIS — I51.89 OTHER ILL-DEFINED HEART DISEASES: ICD-10-CM

## 2022-03-03 DIAGNOSIS — I10 BENIGN ESSENTIAL HYPERTENSION: ICD-10-CM

## 2022-03-03 DIAGNOSIS — R00.2 PALPITATIONS: Primary | ICD-10-CM

## 2022-03-03 DIAGNOSIS — I47.29 PAROXYSMAL VENTRICULAR TACHYCARDIA (H): ICD-10-CM

## 2022-03-03 DIAGNOSIS — I50.20 HEART FAILURE WITH REDUCED EJECTION FRACTION, NYHA CLASS III (H): ICD-10-CM

## 2022-03-03 DIAGNOSIS — I42.8 NONISCHEMIC CARDIOMYOPATHY (H): ICD-10-CM

## 2022-03-03 PROCEDURE — G0463 HOSPITAL OUTPT CLINIC VISIT: HCPCS

## 2022-03-03 PROCEDURE — 93248 EXT ECG>7D<15D REV&INTERPJ: CPT | Performed by: INTERNAL MEDICINE

## 2022-03-03 PROCEDURE — 99214 OFFICE O/P EST MOD 30 MIN: CPT | Mod: 25 | Performed by: INTERNAL MEDICINE

## 2022-03-03 PROCEDURE — 93246 EXT ECG>7D<15D RECORDING: CPT

## 2022-03-03 PROCEDURE — 93282 PRGRMG EVAL IMPLANTABLE DFB: CPT | Performed by: INTERNAL MEDICINE

## 2022-03-03 RX ORDER — CARVEDILOL 12.5 MG/1
18.75 TABLET ORAL 2 TIMES DAILY
Qty: 270 TABLET | Refills: 1 | Status: SHIPPED | OUTPATIENT
Start: 2022-03-03

## 2022-03-03 ASSESSMENT — PAIN SCALES - GENERAL: PAINLEVEL: NO PAIN (0)

## 2022-03-03 NOTE — LETTER
3/3/2022      RE: Vinay Patrick  [address]  [facility] [address]       Dear Colleague,    Thank you for the opportunity to participate in the care of your patient, Vinay Patrick, at the [facility]. Please see a copy of my visit note below.    March 3, 2022     Mr. Patrick is a 46 year old male with PMHx of systolic heart failure, HFrEF with an EF:15-20%, NICM, ICD for primary prevention, Vtach who presents today for follow-up visit after recent virtual appt.  Patient has a history of NICM. Was first diagnosed on 12/14/2016 with an EF:20%. Patient underwent coronary angiogram that revealed no coronary artery disease. Repeat echo revealed an EF:30%, and as a result patient had an ICD placed for primary prevention on 12/13/2017. Patient had a VF arrest on 11/8/2018 that necessitated an appropriate shock from ICD at the time. Patient was admitted and discharged.  Patient had several ICD interrogations which revealed runs of NSVT and episode of SVT. Patient has had a known history of medication noncompliance and continues to smoke 1/2 PPD. Due to patient's persistent LVEF of 20%, he was referred to advanced heart failure clinic for further work up.   Overall he did have episodes of palpitations and when he was in the Cath Lab he did have a prolonged episode of slow VT.  He continues to feel poorly and he does not have the energy.  Overall no other changes.  Unfortunately continues to smoke    SOCIAL HISTORY:  Social History     Socioeconomic History     Marital status:      Spouse name: Not on file     Number of children: Not on file     Years of education: Not on file     Highest education level: Not on file   Occupational History     Not on file   Tobacco Use     Smoking status: Current Every Day Smoker     Types: Cigarettes     Smokeless tobacco: Never Used   Substance and Sexual Activity     Alcohol use:  Patient called today with regards to the following medication refill request:    Provider: Jericho Ruiz MD  Medication: Alprazolam  Strength: 0.5 mg  Last office visit: 08/17/18  Pharmacy:   [facility] [address] [place] [id number]  Phone: [phone] Fax: [phone]      For additional information, or if you need to contact the patient, please call patient at the following number:      Mobile [phone]       Patient states that it is okay to leave a detailed message.    Patient was instructed to call pharmacy directly for future refills.      Advised Patient that refill processing may take 48-72 hours and that the pharmacy will contact them when their prescription is ready for pickup.    Not on file     Drug use: Not on file     Sexual activity: Not on file   Other Topics Concern     Not on file   Social History Narrative     Not on file     Social Determinants of Health     Financial Resource Strain: Not on file   Food Insecurity: Not on file   Transportation Needs: Not on file   Physical Activity: Not on file   Stress: Not on file   Social Connections: Not on file   Intimate Partner Violence: Not on file   Housing Stability: Not on file     CURRENT MEDICATIONS:  Current Outpatient Medications   Medication     albuterol (PROAIR HFA/PROVENTIL HFA/VENTOLIN HFA) 108 (90 Base) MCG/ACT inhaler     baclofen (LIORESAL) 10 MG tablet     bumetanide (BUMEX) 2 MG tablet     busPIRone (BUSPAR) 10 MG tablet     carvedilol (COREG) 12.5 MG tablet     dapagliflozin (FARXIGA) 10 MG TABS tablet     divalproex sodium extended-release (DEPAKOTE ER) 500 MG 24 hr tablet     fenofibrate (TRIGLIDE/LOFIBRA) 160 MG tablet     glucagon 1 MG kit     insulin glargine (LANTUS PEN) 100 UNIT/ML pen     ivabradine (CORLANOR) 7.5 MG tablet     ondansetron (ZOFRAN) 4 MG tablet     PARoxetine (PAXIL) 40 MG tablet     QUEtiapine (SEROQUEL) 100 MG tablet     QUEtiapine (SEROQUEL) 25 MG tablet     rosuvastatin (CRESTOR) 20 MG tablet     sacubitril-valsartan (ENTRESTO) 24-26 MG per tablet     spironolactone (ALDACTONE) 25 MG tablet     traZODone (DESYREL) 50 MG tablet     No current facility-administered medications for this visit.     ROS:   Constitutional: No fever, chills, or sweats. Weight is 0 lbs 0 oz  ENT: No visual disturbance, ear ache, epistaxis, sore throat.   Allergies/Immunologic: Negative.   Respiratory: No cough, hemoptysis.   Cardiovascular: As per HPI.   GI: No nausea, vomiting, hematemesis, melena, or hematochezia.   : No urinary frequency, dysuria, or hematuria.   Integument: Negative.   Psychiatric: Pleasant, no major depression noted  Neuro: No focal neurological deficits noted  Endocrinology: Negative.    Musculoskeletal: As per HPI.      EXAM:  BP (!) 146/101 (BP Location: Right arm, Patient Position: Chair, Cuff Size: Adult Regular)   Pulse 109   Wt 83.6 kg (184 lb 4.8 oz)   SpO2 98%   BMI 25.72 kg/m    General: appears comfortable, alert and oriented  Head: normocephalic, atraumatic  Eyes: anicteric sclera, EOMI , PERRL  Neck: no adenopathy  Orophyarynx: moist mucosa, no lesions noted  Heart: regular, S1/S2, no murmurs, rubs or gallop. Estimated JVP at 5 cmH2O  Lungs: CTAB, No wheezing.   Abdomen: soft, non-tender, bowel sounds present, no hepatosplenomegaly  Extremities: No LE edema today  Skin: no open lesions noted  Neuro: grossly non-focal     Labs:  Lab Results   Component Value Date    WBC 9.0 01/26/2022    HGB 14.4 01/26/2022    HCT 43.7 01/26/2022     01/26/2022     01/26/2022    POTASSIUM 3.9 01/26/2022    CHLORIDE 108 01/26/2022    CO2 22 01/26/2022    BUN 20 01/26/2022    CR 0.84 01/26/2022     (H) 01/26/2022    AST 21 01/26/2022    ALT 46 01/26/2022    ALKPHOS 71 01/26/2022    BILITOTAL 0.8 01/26/2022    INR 1.00 01/26/2022     TTE 11/10/2021  Contrast,Definity Left Ventricle:   Markedly dilated left ventricle. Markedly reduced left ventricular systolic function. The ejection fraction is visually estimated to be 20 %.     Left Atrium: Mildly dilated left atrium.     Aortic Valve: Trivial aortic regurgitation.     Mitral Valve:  Moderate-to-severe mitral regurgitation.     Right Ventricle: Reduced right ventricular systolic function.     Right Atrium: Normal right atrial size by visual assessment.     Tricuspid Valve: Trivial tricuspid regurgitation.        RHC 1/26/22:  RA: 7  PA: 35/25/17  W: 17  AO: 100/64/77  PA sat: 65%  Chrissie CI: 2  HE HAD SLOW VT lasting for 3 minutes in the cath lab during the procedure.     LORENE 1/26/22:  Mild-moderate functional MR present related to restricted motion of the posterior mitral valve leaflet.  Left ventricular function is decreased. The  ejection fraction is 10-20% (severely reduced).Severe left ventricular dilation is present.  Global right ventricular function is normal.  Previous study not available for direct comparison.    CPX 1/26/22:  Peak Vo2: 6.3  [place]: 40  RER: 0.88    ASSESSMENT AND PLAN:  Mr. Patrick is a 46 year old male with PMHx of systolic heart failure, HFrEF with an EF:15-20%, NICM, ICD for primary prevention, Vtach who presents today for follow-up.     #NICM  #HFrEF with an EF: 15-20%  #S/p ICD  Patient with a history of NICM and an EF:20%. Endorsing NYHA Class 3 symptoms with no significant improvement in his symptoms despite being on OGDT. His symptoms are likely multifactorial from his heart failure, vtach and his moderate-severe MR. Our initial recommendation was to work on his OGDT. We increased his entresto to 24/26mg BID and started aldactone 12.5mg qday.  However only minimal changes noted symptomatically.    .  Complete hemodynamic evaluation as listed above and please note again that he did have a prolonged episode of sustained slow VT during this.  Also his CPX is concerning for very low functional capacity.  Unfortunately continues to smoke at this time.  We will make the following changes today:  -We will increase Coreg to 1-1/2 pills twice daily there is 18.75 mg twice daily dosing  -Entresto continue 24 mg twice daily, continue  - ASA 81mg  - Farxiga 10mg qday, continue  - Crestor 20mg qday, consider increasing in the future given severely elevated Triglycerides  - Bumex 2mg qday, continue  - Start Aldactone as above  -We will discuss with  what we could do to make him feel better with regards from the VT standpoint.  May be need antiarrhythmic treatment as per outlined in the note from November 2021  -We will put on a 10-day Zio patch because I think the VT is too slow for the being detected by his device  -We will interrogate his ICD today  -We will obtain blood work today  -I will set him up for a  follow-up virtual appointment in a month from now.    He did have a long discussion with regards to smoking again and how much it prevents him from being a transplant candidate.  He will work on it and quit as soon as possible    #Moderate to severe MR -mild on the most recent LORENE  EF:20%, LVESD is 6.5cm and severe MR with an ERO:0.4. His BP at the time of the echo was 120/90 with a MAP of 90. With an elevated MAP, we are concerned that his MR could be artifically higher.  We will increase entresto, improve his afterload and recheck a TTE to assess his MR once his MAP has improved  - Afterload reduction as above      #Vtach  Has a history of ventricular tachycardia with recent device shock on 10/2021  Referred to EP     I appreciate the opportunity to participate in the care of Vinay Polo Patrick . Please do not hesitate to contact me with any further questions.     Sincerely,   Santiago Batista MD  [facility] Division of Cardiology       Please do not hesitate to contact me if you have any questions/concerns.     Sincerely,     Santiago Batista MD

## 2022-03-03 NOTE — PROGRESS NOTES
Per Dr. Batista, patient to have 10-day Zio monitor placed.  Diagnosis: palpitations  Monitor placed: Yes  Patient Instructed: Yes  Patient verbalized understanding: Yes  Holter # [id number]    Monitor was placed by KAMINI Murdock.  10:15 AM

## 2022-03-03 NOTE — PROGRESS NOTES
March 3, 2022     Mr. Patrick is a 46 year old male with PMHx of systolic heart failure, HFrEF with an EF:15-20%, NICM, ICD for primary prevention, Vtach who presents today for follow-up visit after recent virtual appt.  Patient has a history of NICM. Was first diagnosed on 12/14/2016 with an EF:20%. Patient underwent coronary angiogram that revealed no coronary artery disease. Repeat echo revealed an EF:30%, and as a result patient had an ICD placed for primary prevention on 12/13/2017. Patient had a VF arrest on 11/8/2018 that necessitated an appropriate shock from ICD at the time. Patient was admitted and discharged.  Patient had several ICD interrogations which revealed runs of NSVT and episode of SVT. Patient has had a known history of medication noncompliance and continues to smoke 1/2 PPD. Due to patient's persistent LVEF of 20%, he was referred to advanced heart failure clinic for further work up.   Overall he did have episodes of palpitations and when he was in the Cath Lab he did have a prolonged episode of slow VT.  He continues to feel poorly and he does not have the energy.  Overall no other changes.  Unfortunately continues to smoke    SOCIAL HISTORY:  Social History     Socioeconomic History     Marital status:      Spouse name: Not on file     Number of children: Not on file     Years of education: Not on file     Highest education level: Not on file   Occupational History     Not on file   Tobacco Use     Smoking status: Current Every Day Smoker     Types: Cigarettes     Smokeless tobacco: Never Used   Substance and Sexual Activity     Alcohol use: Not on file     Drug use: Not on file     Sexual activity: Not on file   Other Topics Concern     Not on file   Social History Narrative     Not on file     Social Determinants of Health     Financial Resource Strain: Not on file   Food Insecurity: Not on file   Transportation Needs: Not on file   Physical Activity: Not on file   Stress: Not on file    Social Connections: Not on file   Intimate Partner Violence: Not on file   Housing Stability: Not on file     CURRENT MEDICATIONS:  Current Outpatient Medications   Medication     albuterol (PROAIR HFA/PROVENTIL HFA/VENTOLIN HFA) 108 (90 Base) MCG/ACT inhaler     baclofen (LIORESAL) 10 MG tablet     bumetanide (BUMEX) 2 MG tablet     busPIRone (BUSPAR) 10 MG tablet     carvedilol (COREG) 12.5 MG tablet     dapagliflozin (FARXIGA) 10 MG TABS tablet     divalproex sodium extended-release (DEPAKOTE ER) 500 MG 24 hr tablet     fenofibrate (TRIGLIDE/LOFIBRA) 160 MG tablet     glucagon 1 MG kit     insulin glargine (LANTUS PEN) 100 UNIT/ML pen     ivabradine (CORLANOR) 7.5 MG tablet     ondansetron (ZOFRAN) 4 MG tablet     PARoxetine (PAXIL) 40 MG tablet     QUEtiapine (SEROQUEL) 100 MG tablet     QUEtiapine (SEROQUEL) 25 MG tablet     rosuvastatin (CRESTOR) 20 MG tablet     sacubitril-valsartan (ENTRESTO) 24-26 MG per tablet     spironolactone (ALDACTONE) 25 MG tablet     traZODone (DESYREL) 50 MG tablet     No current facility-administered medications for this visit.     ROS:   Constitutional: No fever, chills, or sweats. Weight is 0 lbs 0 oz  ENT: No visual disturbance, ear ache, epistaxis, sore throat.   Allergies/Immunologic: Negative.   Respiratory: No cough, hemoptysis.   Cardiovascular: As per HPI.   GI: No nausea, vomiting, hematemesis, melena, or hematochezia.   : No urinary frequency, dysuria, or hematuria.   Integument: Negative.   Psychiatric: Pleasant, no major depression noted  Neuro: No focal neurological deficits noted  Endocrinology: Negative.   Musculoskeletal: As per HPI.      EXAM:  BP (!) 146/101 (BP Location: Right arm, Patient Position: Chair, Cuff Size: Adult Regular)   Pulse 109   Wt 83.6 kg (184 lb 4.8 oz)   SpO2 98%   BMI 25.72 kg/m    General: appears comfortable, alert and oriented  Head: normocephalic, atraumatic  Eyes: anicteric sclera, EOMI , PERRL  Neck: no  adenopathy  Orophyarynx: moist mucosa, no lesions noted  Heart: regular, S1/S2, no murmurs, rubs or gallop. Estimated JVP at 5 cmH2O  Lungs: CTAB, No wheezing.   Abdomen: soft, non-tender, bowel sounds present, no hepatosplenomegaly  Extremities: No LE edema today  Skin: no open lesions noted  Neuro: grossly non-focal     Labs:  Lab Results   Component Value Date    WBC 9.0 01/26/2022    HGB 14.4 01/26/2022    HCT 43.7 01/26/2022     01/26/2022     01/26/2022    POTASSIUM 3.9 01/26/2022    CHLORIDE 108 01/26/2022    CO2 22 01/26/2022    BUN 20 01/26/2022    CR 0.84 01/26/2022     (H) 01/26/2022    AST 21 01/26/2022    ALT 46 01/26/2022    ALKPHOS 71 01/26/2022    BILITOTAL 0.8 01/26/2022    INR 1.00 01/26/2022     TTE 11/10/2021  Contrast,Definity Left Ventricle:   Markedly dilated left ventricle. Markedly reduced left ventricular systolic function. The ejection fraction is visually estimated to be 20 %.     Left Atrium: Mildly dilated left atrium.     Aortic Valve: Trivial aortic regurgitation.     Mitral Valve:  Moderate-to-severe mitral regurgitation.     Right Ventricle: Reduced right ventricular systolic function.     Right Atrium: Normal right atrial size by visual assessment.     Tricuspid Valve: Trivial tricuspid regurgitation.        RHC 1/26/22:  RA: 7  PA: 35/25/17  W: 17  AO: 100/64/77  PA sat: 65%  Chrissie CI: 2  HE HAD SLOW VT lasting for 3 minutes in the cath lab during the procedure.     LORENE 1/26/22:  Mild-moderate functional MR present related to restricted motion of the posterior mitral valve leaflet.  Left ventricular function is decreased. The ejection fraction is 10-20% (severely reduced).Severe left ventricular dilation is present.  Global right ventricular function is normal.  Previous study not available for direct comparison.    CPX 1/26/22:  Peak Vo2: 6.3  [place]: 40  RER: 0.88    ASSESSMENT AND PLAN:  Mr. Patrick is a 46 year old male with PMHx of systolic heart failure,  HFrEF with an EF:15-20%, NICM, ICD for primary prevention, Vtach who presents today for follow-up.     #NICM  #HFrEF with an EF: 15-20%  #S/p ICD  Patient with a history of NICM and an EF:20%. Endorsing NYHA Class 3 symptoms with no significant improvement in his symptoms despite being on OGDT. His symptoms are likely multifactorial from his heart failure, vtach and his moderate-severe MR. Our initial recommendation was to work on his OGDT. We increased his entresto to 24/26mg BID and started aldactone 12.5mg qday.  However only minimal changes noted symptomatically.    .  Complete hemodynamic evaluation as listed above and please note again that he did have a prolonged episode of sustained slow VT during this.  Also his CPX is concerning for very low functional capacity.  Unfortunately continues to smoke at this time.  We will make the following changes today:  -We will increase Coreg to 1-1/2 pills twice daily there is 18.75 mg twice daily dosing  -Entresto continue 24 mg twice daily, continue  - ASA 81mg  - Farxiga 10mg qday, continue  - Crestor 20mg qday, consider increasing in the future given severely elevated Triglycerides  - Bumex 2mg qday, continue  - Start Aldactone as above  -We will discuss with  what we could do to make him feel better with regards from the VT standpoint.  May be need antiarrhythmic treatment as per outlined in the note from November 2021  -We will put on a 10-day Zio patch because I think the VT is too slow for the being detected by his device  -We will interrogate his ICD today  -We will obtain blood work today  -I will set him up for a follow-up virtual appointment in a month from now.    He did have a long discussion with regards to smoking again and how much it prevents him from being a transplant candidate.  He will work on it and quit as soon as possible    #Moderate to severe MR -mild on the most recent LORENE  EF:20%, LVESD is 6.5cm and severe MR with an ERO:0.4. His BP  at the time of the echo was 120/90 with a MAP of 90. With an elevated MAP, we are concerned that his MR could be artifically higher.  We will increase entresto, improve his afterload and recheck a TTE to assess his MR once his MAP has improved  - Afterload reduction as above      #Jesus  Has a history of ventricular tachycardia with recent device shock on 10/2021  Referred to EP     I appreciate the opportunity to participate in the care of Vinay Polo Patrick . Please do not hesitate to contact me with any further questions.     Sincerely,   Santiago Batista MD  [facility] Division of Cardiology

## 2022-03-03 NOTE — PATIENT INSTRUCTIONS
Dr. Batista recommends:    Increase Coreg to 18.75 MG twice daily.(prescription sent to pharmacy)    Zio patch heart monitor. (placed in clinic)     Labs today.    Follow up virtual visit with Dr. Batista in 1 month.    Thank you for your visit today.  Please call me with any questions or concerns.   Grayson Carr RN  Cardiology Care Coordinator  [phone], press option 1 then option 4

## 2022-03-03 NOTE — NURSING NOTE
Chief Complaint   Patient presents with     Follow Up     return HF- Chronic systolic congestive heart failure, Dilated cardiomyopathy, and Atrial tachycardia.     Vitals were taken and medications reconciled.    Imtiaz Coughlin, KAMINI  9:16 AM

## 2022-03-03 NOTE — LETTER
Date:March 3, 2022      Patient was self referred, no letter generated. Do not send.        [facility] Health Information

## 2022-03-10 LAB
MDC_IDC_LEAD_IMPLANT_DT: NORMAL
MDC_IDC_LEAD_LOCATION: NORMAL
MDC_IDC_LEAD_LOCATION_DETAIL_1: NORMAL
MDC_IDC_LEAD_MFG: NORMAL
MDC_IDC_LEAD_MODEL: NORMAL
MDC_IDC_LEAD_SERIAL: NORMAL
MDC_IDC_MSMT_BATTERY_STATUS: NORMAL
MDC_IDC_MSMT_BATTERY_VOLTAGE: 3.11 V
MDC_IDC_MSMT_CAP_CHARGE_ENERGY: 40 J
MDC_IDC_MSMT_CAP_CHARGE_TIME: 10 S
MDC_IDC_MSMT_CAP_CHARGE_TYPE: NORMAL
MDC_IDC_MSMT_LEADCHNL_RA_SENSING_INTR_AMPL: 3.5 MV
MDC_IDC_MSMT_LEADCHNL_RV_IMPEDANCE_VALUE: 577 OHM
MDC_IDC_MSMT_LEADCHNL_RV_SENSING_INTR_AMPL: 24.2 MV
MDC_IDC_PG_IMPLANT_DTM: NORMAL
MDC_IDC_PG_MFG: NORMAL
MDC_IDC_PG_MODEL: NORMAL
MDC_IDC_PG_SERIAL: NORMAL
MDC_IDC_PG_TYPE: NORMAL
MDC_IDC_SESS_CLINIC_NAME: NORMAL
MDC_IDC_SESS_DTM: NORMAL
MDC_IDC_SESS_REPROGRAMMED: NORMAL
MDC_IDC_SESS_TYPE: NORMAL
MDC_IDC_SET_BRADY_AT_MODE_SWITCH_MODE: NORMAL
MDC_IDC_SET_BRADY_AT_MODE_SWITCH_RATE: 160 {BEATS}/MIN
MDC_IDC_SET_BRADY_HYSTRATE: 40 {BEATS}/MIN
MDC_IDC_SET_BRADY_LOWRATE: 40 {BEATS}/MIN
MDC_IDC_SET_BRADY_MAX_SENSOR_RATE: 100 {BEATS}/MIN
MDC_IDC_SET_BRADY_MAX_TRACKING_RATE: 110 {BEATS}/MIN
MDC_IDC_SET_BRADY_MODE: NORMAL
MDC_IDC_SET_BRADY_NIGHT_RATE: 40 {BEATS}/MIN
MDC_IDC_SET_CRT_PACED_CHAMBERS: NORMAL
MDC_IDC_SET_LEADCHNL_LV_PACING_CATHODE_ELECTRODE_1: NORMAL
MDC_IDC_SET_LEADCHNL_LV_PACING_CATHODE_LOCATION_1: NORMAL
MDC_IDC_SET_LEADCHNL_LV_PACING_POLARITY: NORMAL
MDC_IDC_SET_LEADCHNL_LV_SENSING_CATHODE_ELECTRODE_1: NORMAL
MDC_IDC_SET_LEADCHNL_LV_SENSING_CATHODE_LOCATION_1: NORMAL
MDC_IDC_SET_LEADCHNL_LV_SENSING_POLARITY: NORMAL
MDC_IDC_SET_LEADCHNL_RA_PACING_CATHODE_ELECTRODE_1: NORMAL
MDC_IDC_SET_LEADCHNL_RA_PACING_CATHODE_LOCATION_1: NORMAL
MDC_IDC_SET_LEADCHNL_RA_PACING_POLARITY: NORMAL
MDC_IDC_SET_LEADCHNL_RA_SENSING_POLARITY: NORMAL
MDC_IDC_SET_LEADCHNL_RA_SENSING_SENSITIVITY: 1 MV
MDC_IDC_SET_LEADCHNL_RV_PACING_AMPLITUDE: 2 V
MDC_IDC_SET_LEADCHNL_RV_PACING_POLARITY: NORMAL
MDC_IDC_SET_LEADCHNL_RV_PACING_PULSEWIDTH: 0.4 MS
MDC_IDC_SET_LEADCHNL_RV_SENSING_POLARITY: NORMAL
MDC_IDC_SET_LEADCHNL_RV_SENSING_SENSITIVITY: 0.8 MV
MDC_IDC_SET_ZONE_DETECTION_INTERVAL: 270 MS
MDC_IDC_SET_ZONE_DETECTION_INTERVAL: 330 MS
MDC_IDC_SET_ZONE_DETECTION_INTERVAL: 480 MS
MDC_IDC_SET_ZONE_TYPE: NORMAL
MDC_IDC_STAT_AT_MODE_SW_COUNT: 9
MDC_IDC_STAT_BRADY_RV_PERCENT_PACED: 3 %
MDC_IDC_STAT_EPISODE_RECENT_COUNT: 41
MDC_IDC_STAT_EPISODE_RECENT_COUNT: 9
MDC_IDC_STAT_EPISODE_TYPE: NORMAL
MDC_IDC_STAT_EPISODE_TYPE: NORMAL
MDC_IDC_STAT_TACHYTHERAPY_SHOCKS_ABORTED_TOTAL: 1
MDC_IDC_STAT_TACHYTHERAPY_SHOCKS_DELIVERED_RECENT: 0
MDC_IDC_STAT_TACHYTHERAPY_SHOCKS_DELIVERED_TOTAL: 2

## 2022-03-22 ENCOUNTER — TELEPHONE (OUTPATIENT)
Dept: CARDIOLOGY | Facility: CLINIC | Age: 47
End: 2022-03-22
Payer: MEDICAID

## 2022-03-22 NOTE — TELEPHONE ENCOUNTER
Attempted to reach patient to schedule cardiology follow up. Please schedule with Dr. Goldberg in EP with a device check in clinic prior.   Cardio Team

## 2022-05-20 ENCOUNTER — TELEPHONE (OUTPATIENT)
Dept: CARDIOLOGY | Facility: CLINIC | Age: 47
End: 2022-05-20
Payer: MEDICAID

## 2022-05-24 ENCOUNTER — TELEPHONE (OUTPATIENT)
Dept: CARDIOLOGY | Facility: CLINIC | Age: 47
End: 2022-05-24
Payer: MEDICAID

## 2022-10-03 ENCOUNTER — HEALTH MAINTENANCE LETTER (OUTPATIENT)
Age: 47
End: 2022-10-03

## 2023-02-11 ENCOUNTER — HEALTH MAINTENANCE LETTER (OUTPATIENT)
Age: 48
End: 2023-02-11

## 2023-08-13 ENCOUNTER — HEALTH MAINTENANCE LETTER (OUTPATIENT)
Age: 48
End: 2023-08-13

## 2024-03-09 ENCOUNTER — HEALTH MAINTENANCE LETTER (OUTPATIENT)
Age: 49
End: 2024-03-09

## 2024-04-16 ENCOUNTER — MEDICAL CORRESPONDENCE (OUTPATIENT)
Dept: HEALTH INFORMATION MANAGEMENT | Facility: CLINIC | Age: 49
End: 2024-04-16
Payer: MEDICAID

## 2024-04-17 ENCOUNTER — MEDICAL CORRESPONDENCE (OUTPATIENT)
Dept: HEALTH INFORMATION MANAGEMENT | Facility: CLINIC | Age: 49
End: 2024-04-17
Payer: MEDICAID

## 2024-04-18 ENCOUNTER — TRANSCRIBE ORDERS (OUTPATIENT)
Dept: OTHER | Age: 49
End: 2024-04-18

## 2024-04-18 DIAGNOSIS — I42.0 DILATED CARDIOMYOPATHY (H): ICD-10-CM

## 2024-04-18 DIAGNOSIS — I50.22 CHRONIC SYSTOLIC CONGESTIVE HEART FAILURE (H): ICD-10-CM

## 2024-04-18 DIAGNOSIS — Z95.810 PRESENCE OF COMBINATION INTERNAL CARDIAC DEFIBRILLATOR (ICD) AND PACEMAKER: Primary | ICD-10-CM

## 2024-04-23 ENCOUNTER — TELEPHONE (OUTPATIENT)
Dept: CARDIOLOGY | Facility: CLINIC | Age: 49
End: 2024-04-23
Payer: MEDICAID

## 2024-04-23 NOTE — TELEPHONE ENCOUNTER
Left Voicemail (1st Attempt) for the patient to call back and schedule the following:    Appointment type: RHF  Provider: HEDY   Return date: NEXT AVAILABLE   Specialty phone number: [phone] OPT 1   Additional appointment(s) needed: N/A   Additonal Notes: N/A      Left Voicemail (2nd Attempt) for the patient to call back and schedule the following:    Appointment type: RHF  Provider: Hedy  Return date: next available  Specialty phone number: [phone]  Additional appointment(s) needed: labs prior  Additonal Notes: LVM max attempts reached

## 2024-04-29 ENCOUNTER — TELEPHONE (OUTPATIENT)
Dept: CARDIOLOGY | Facility: CLINIC | Age: 49
End: 2024-04-29
Payer: MEDICAID

## 2024-04-29 NOTE — TELEPHONE ENCOUNTER
4/29 Patient confirmed scheduled appointment:  Date: 8/13/2024  Time: 10:30 am  Visit type: Return Heart Failure  Provider: Lester  [place] [facility] [place]/imaging: labs prior  Additional notes: n/a

## 2024-04-29 NOTE — TELEPHONE ENCOUNTER
4/29 Patient confirmed scheduled appointment:  Date: 6/14/24  Time: 7:45 am  Visit type: Enrollment Core  Provider: Rosalio  [place] [facility]  Testing/imaging: labs prior  Additional notes: n/a

## 2024-06-10 DIAGNOSIS — I50.22 CHRONIC HFREF (HEART FAILURE WITH REDUCED EJECTION FRACTION) (H): Primary | ICD-10-CM

## 2024-06-10 DIAGNOSIS — I50.22 CHRONIC SYSTOLIC HEART FAILURE (H): ICD-10-CM

## 2024-08-12 ENCOUNTER — CARE COORDINATION (OUTPATIENT)
Dept: CARDIOLOGY | Facility: CLINIC | Age: 49
End: 2024-08-12
Payer: MEDICAID

## 2024-08-12 DIAGNOSIS — I50.22 CHRONIC SYSTOLIC HEART FAILURE (H): Primary | ICD-10-CM

## (undated) DEVICE — INTRO SHEATH 7FRX10CM PINNACLE RSS702

## (undated) DEVICE — KIT RIGHT HEART CATH 60130719

## (undated) DEVICE — Device

## (undated) DEVICE — PACK HEART RIGHT CUSTOM SAN32RHF18

## (undated) RX ORDER — FENTANYL CITRATE 50 UG/ML
INJECTION, SOLUTION INTRAMUSCULAR; INTRAVENOUS
Status: DISPENSED
Start: 2022-01-26

## (undated) RX ORDER — LIDOCAINE HYDROCHLORIDE 20 MG/ML
SOLUTION OROPHARYNGEAL
Status: DISPENSED
Start: 2022-01-26